# Patient Record
Sex: FEMALE | Race: WHITE | NOT HISPANIC OR LATINO | URBAN - METROPOLITAN AREA
[De-identification: names, ages, dates, MRNs, and addresses within clinical notes are randomized per-mention and may not be internally consistent; named-entity substitution may affect disease eponyms.]

---

## 2018-10-24 ENCOUNTER — OUTPATIENT (OUTPATIENT)
Dept: OUTPATIENT SERVICES | Facility: HOSPITAL | Age: 43
LOS: 1 days | Discharge: HOME | End: 2018-10-24

## 2018-10-24 DIAGNOSIS — Z12.31 ENCOUNTER FOR SCREENING MAMMOGRAM FOR MALIGNANT NEOPLASM OF BREAST: ICD-10-CM

## 2020-01-03 ENCOUNTER — OUTPATIENT (OUTPATIENT)
Dept: OUTPATIENT SERVICES | Facility: HOSPITAL | Age: 45
LOS: 1 days | Discharge: HOME | End: 2020-01-03
Payer: MEDICAID

## 2020-01-03 DIAGNOSIS — N64.4 MASTODYNIA: ICD-10-CM

## 2020-01-03 PROCEDURE — 77062 BREAST TOMOSYNTHESIS BI: CPT | Mod: 26

## 2020-01-03 PROCEDURE — 77066 DX MAMMO INCL CAD BI: CPT | Mod: 26

## 2020-01-03 PROCEDURE — 76642 ULTRASOUND BREAST LIMITED: CPT | Mod: 26,RT

## 2021-08-06 ENCOUNTER — TRANSCRIPTION ENCOUNTER (OUTPATIENT)
Age: 46
End: 2021-08-06

## 2022-05-24 PROBLEM — Z00.00 ENCOUNTER FOR PREVENTIVE HEALTH EXAMINATION: Status: ACTIVE | Noted: 2022-05-24

## 2022-05-26 ENCOUNTER — APPOINTMENT (OUTPATIENT)
Dept: NUTRITION | Facility: CLINIC | Age: 47
End: 2022-05-26

## 2022-05-26 ENCOUNTER — APPOINTMENT (OUTPATIENT)
Dept: ANTEPARTUM | Facility: CLINIC | Age: 47
End: 2022-05-26
Payer: MEDICAID

## 2022-05-26 ENCOUNTER — OUTPATIENT (OUTPATIENT)
Dept: OUTPATIENT SERVICES | Facility: HOSPITAL | Age: 47
LOS: 1 days | Discharge: HOME | End: 2022-05-26

## 2022-05-26 ENCOUNTER — ASOB RESULT (OUTPATIENT)
Age: 47
End: 2022-05-26

## 2022-05-26 VITALS
OXYGEN SATURATION: 99 % | DIASTOLIC BLOOD PRESSURE: 69 MMHG | TEMPERATURE: 98 F | HEART RATE: 89 BPM | WEIGHT: 185 LBS | SYSTOLIC BLOOD PRESSURE: 131 MMHG

## 2022-05-26 DIAGNOSIS — Z78.9 OTHER SPECIFIED HEALTH STATUS: ICD-10-CM

## 2022-05-26 LAB
BILIRUB UR QL STRIP: NEGATIVE
CLARITY UR: CLEAR
COLLECTION METHOD: NORMAL
FETAL HEART DESCRIPTION: NORMAL
FETAL HEART RATE (BPM): 145
FETAL MOVEMENT: PRESENT
GLUCOSE BLDC GLUCOMTR-MCNC: 134
GLUCOSE UR-MCNC: NEGATIVE
HCG UR QL: 0.2 EU/DL
HGB UR QL STRIP.AUTO: NEGATIVE
KETONES UR-MCNC: NORMAL
LEUKOCYTE ESTERASE UR QL STRIP: NEGATIVE
NITRITE UR QL STRIP: NEGATIVE
OB COMMENTS: NORMAL
PH UR STRIP: 6
PROT UR STRIP-MCNC: NEGATIVE
SCHEDULED VISIT: YES
SP GR UR STRIP: 1
URINE ALBUMIN/PROTEIN: NEGATIVE
URINE GLUCOSE: NEGATIVE
URINE KETONES: NEGATIVE
WEEKS GESTATION: 26.3

## 2022-05-26 PROCEDURE — 99203 OFFICE O/P NEW LOW 30 MIN: CPT

## 2022-05-26 PROCEDURE — 76816 OB US FOLLOW-UP PER FETUS: CPT | Mod: 26

## 2022-05-26 PROCEDURE — ZZZZZ: CPT

## 2022-05-26 RX ORDER — FOLIC ACID 1 MG/1
1 TABLET ORAL
Refills: 0 | Status: ACTIVE | COMMUNITY
Start: 2022-05-26

## 2022-05-26 RX ORDER — PNV NO.95/FERROUS FUM/FOLIC AC 28MG-0.8MG
TABLET ORAL
Refills: 0 | Status: ACTIVE | COMMUNITY
Start: 2022-05-26

## 2022-05-26 NOTE — DISCUSSION/SUMMARY
[FreeTextEntry1] : Ms. Jarquin 47 yo  @  26 weeks 3 days referred by Dr. Rajan for gestational diabetes.\par \par Counseling: \par \par 1.  The patient will be evaluated by a nutritionist and instructed in adhering to a diabetic diet.  This is scheduled for 2 weeks from now.\par \par 2.  She was counseled by a nurse and instructed in capillary blood glucose testing (fasting and 2 hours after each meal).\par \par 3.  The significance and usual management of diabetes in pregnancy were reviewed, including risks of preeclampsia, Intra-Uterine Fetal Demise, macrosomia, birth trauma, pre-term labor,  section, NICU admission (the main reasons include  hypoglycemia and  jaundice) and the possible need for insulin therapy.\par \par 4.  Exercise was encouraged.  Ideally, she should walk briskly after each meal.\par \par Recommendations:\par \par 1.  Glycemic Control.  Target glucose ranges to minimize excessive fetal growth are:  Fasting 60-90 mg/dl; preprandial  mg/dl; and 2-hour postprandial < 120 mg/dl.  If these values are elevated, insulin therapy is encouraged, although oral hypoglycemic agents are reasonable to try depending on the finger stick log.\par \par 2.  Antepartum testing.  Daily fetal movement counts are recommended from 28 weeks.  Weekly or twice weekly biophysical profiles may be recommended, the frequency and timing will depend on glucose control.  Ultrasound assessment of fetal growth and macrosomic features is recommended.\par \par 3.  Timing of Delivery.  Most likely delivery will be via repeat  delivery and should occur at around 39 weeks gestation If complications, such as preeclampsia, macrosomia or poor glucose control develop, then delivery plans may need to be revised.\par \par 4.  Route of delivery.   Most likely delivery will be via repeat  delivery.  Diabetes is associated with about a six-fold risk for shoulder dystocia (which includes the risk of irreversible nerve, bone, and brain injury).  Operative vaginal deliveries should be approached with caution if at all.\par \par 5.  Glucose control in labor.  During labor, capillary glucose values should be checked every few hours (depending on their stability).  Insulin may be required to cover elevated glucose levels.\par \par 6.  Long-term diabetes surveillance.  The risk of developing diabetes outside of pregnancy over the next five years may be as high as 50%.  I recommend that she have a 2 hour GTT or similar diabetes screen  at 6 to 12 weeks postpartum with her primary Obstetrician and counseling about ways to minimize her risk.  If her fasting glucose is normal, annual glucose screening by her primary care physician is advised.\par \par Ms. Jarquin has checking her values at home for the past week, she did not bring her log with her today.  She states her fasting values are 87-91 and 90 minute postprandial values (approximately) are between 111-137.   She will start checking her fingersticks 2 hours postprandial.\par \par Advanced maternal age:  The obstetric risks of advanced maternal age were discussed, including but not limited to the increased risk of  gestational hypertension, preeclampsia, gestational diabetes,  labor,  morbidity, placental complications such as abruption and previa, delivery via  and dysfunctional labor, and other maternal morbidity/mortality.  \par \par \par HSV serology positive, she should be on suppression starting at around 36 weeks gestation.\par \par Estimated fetal weight today was 859 grams (18th percentile).\par \par Prenatal care is with Dr. Rajan.\par Fetal movement and labor precautions discussed.\par Estimated fetal weight monthly.\par Weekly biophysical profiles to start by around 37 weeks gestation for advanced maternal age, possibly earlier depending on glycemic control.\par Follow up in 2 weeks with the fingerstick log.\par \par Ms. Jarquin expressed understanding and all of her questions were answered to her satisfaction.  Thank you for this consult.\par \par Gerry Celaya MD

## 2022-05-26 NOTE — PHYSICAL EXAM
[FreeTextEntry1] : General: In no apparent distress.\par HEENT:  Atraumatic. \par Cardiovascular: Regular rate and rhythm, normal S1/S2\par Pulmonary: Clear to auscultation bilaterally.  No wheezing.\par Abdomen: soft, gravid, nontender, nondistended, no rebound, no guarding\par Extremities: no calf tenderness or edema\par Neurology: +2 reflexes in bilateral upper extremities\par Psychiatry:  Happy mood.  Awake , alert, oriented to person, place, time.\par

## 2022-05-26 NOTE — SURGICAL HISTORY
[Fibroids] : fibroids [Cysts] : cysts [Abn Paps] : no abnormal pap smears [STI's] : no STI's [Infertility] : no infertility

## 2022-05-26 NOTE — HISTORY OF PRESENT ILLNESS
[FreeTextEntry1] : [place]  [id number]\par \par Ms. Jarquin 45 yo  @  26 weeks 3 days referred by Dr. Rajan for gestational diabetes.  She has had no issues this pregnancy.\par \par She has been checking her values at home for the past week, she did not bring her log with her today.  She states her fasting values are 87-91 and 90 minute postprandial values (approximately) are between 111-137. \par \par She states she will be scheduled for repeat  delivery.\par \par Doing well, denies contractions, vaginal bleeding, or leakage of fluid. Good fetal movement.\par \par Historical Values:\par  \par  3hr GTT 81/232/191/165\par \par Ob Hx\par \par 1 TOP\par 4 ectopic pregnancies treated with methotrexate\par  girl FT 3100 grams  [facility] cord prolapse.  No gestational diabetes\par

## 2022-05-27 DIAGNOSIS — O09.522 SUPERVISION OF ELDERLY MULTIGRAVIDA, SECOND TRIMESTER: ICD-10-CM

## 2022-05-27 DIAGNOSIS — O24.419 GESTATIONAL DIABETES MELLITUS IN PREGNANCY, UNSPECIFIED CONTROL: ICD-10-CM

## 2022-05-27 DIAGNOSIS — O34.219 MATERNAL CARE FOR UNSPECIFIED TYPE SCAR FROM PREVIOUS CESAREAN DELIVERY: ICD-10-CM

## 2022-05-27 DIAGNOSIS — Z3A.36 36 WEEKS GESTATION OF PREGNANCY: ICD-10-CM

## 2022-06-09 ENCOUNTER — RESULT CHARGE (OUTPATIENT)
Age: 47
End: 2022-06-09

## 2022-06-09 ENCOUNTER — APPOINTMENT (OUTPATIENT)
Dept: NUTRITION | Facility: CLINIC | Age: 47
End: 2022-06-09

## 2022-06-09 ENCOUNTER — OUTPATIENT (OUTPATIENT)
Dept: OUTPATIENT SERVICES | Facility: HOSPITAL | Age: 47
LOS: 1 days | Discharge: HOME | End: 2022-06-09

## 2022-06-09 ENCOUNTER — APPOINTMENT (OUTPATIENT)
Dept: ANTEPARTUM | Facility: CLINIC | Age: 47
End: 2022-06-09
Payer: MEDICAID

## 2022-06-09 ENCOUNTER — APPOINTMENT (OUTPATIENT)
Dept: OBGYN | Facility: CLINIC | Age: 47
End: 2022-06-09
Payer: MEDICAID

## 2022-06-09 VITALS
TEMPERATURE: 98.3 F | HEIGHT: 65 IN | DIASTOLIC BLOOD PRESSURE: 62 MMHG | HEART RATE: 109 BPM | SYSTOLIC BLOOD PRESSURE: 114 MMHG | OXYGEN SATURATION: 100 % | WEIGHT: 184 LBS | BODY MASS INDEX: 30.66 KG/M2

## 2022-06-09 DIAGNOSIS — O24.419 GESTATIONAL DIABETES MELLITUS IN PREGNANCY, UNSPECIFIED CONTROL: ICD-10-CM

## 2022-06-09 DIAGNOSIS — O09.90 SUPERVISION OF HIGH RISK PREGNANCY, UNSPECIFIED, UNSPECIFIED TRIMESTER: ICD-10-CM

## 2022-06-09 PROCEDURE — 99213 OFFICE O/P EST LOW 20 MIN: CPT | Mod: 25

## 2022-06-10 PROBLEM — O24.419 GESTATIONAL DIABETES MELLITUS (GDM): Status: ACTIVE | Noted: 2022-05-26

## 2022-06-10 PROBLEM — O09.90 HIGH RISK PREGNANCY, ANTEPARTUM: Status: ACTIVE | Noted: 2022-05-26

## 2022-06-10 LAB
BILIRUB UR QL STRIP: NORMAL
BP DIAS: 62 MM HG
BP SYS: 114 MM HG
CLARITY UR: CLEAR
COLLECTION METHOD: NORMAL
FETAL HEART RATE (BPM): 144
FETAL MOVEMENT: PRESENT
GLUCOSE BLDC GLUCOMTR-MCNC: 113
GLUCOSE UR-MCNC: NORMAL
HCG UR QL: 0.2 EU/DL
HGB UR QL STRIP.AUTO: NORMAL
KETONES UR-MCNC: NORMAL
LEUKOCYTE ESTERASE UR QL STRIP: NORMAL
NITRITE UR QL STRIP: NORMAL
OB COMMENTS: NORMAL
PH UR STRIP: 7
PROT UR STRIP-MCNC: NORMAL
SCHEDULED VISIT: YES
SP GR UR STRIP: 1.01
URINE ALBUMIN/PROTEIN: NORMAL
URINE GLUCOSE: NORMAL
URINE KETONES: NORMAL
WEEKS GESTATION: 28.3

## 2022-06-10 NOTE — HISTORY OF PRESENT ILLNESS
[FreeTextEntry1] : Ms. Jarquin 47 yo  @28w3d BERNIE 22 referred by Dr. Rajan for gestational diabetes.\par \par Denies contractions, vaginal bleeding, leakage of fluid. Reports good fetal movement. Has some pain along hip crease while walking. \par \par Plans for repeat C/S. \par \par FS today: 113 (1hr postprandial raspberries, salmon tomato salad)\par FS log reviewed:\par Fastin-94\par 2hr PP:  (6 out of 40 elevated)\par \par Historical Values:\par  \par  3hr GTT 81/232/191/165\par

## 2022-06-10 NOTE — DISCUSSION/SUMMARY
[FreeTextEntry1] : Ms. Jarquin 45 yo  @28w3d, BERNIE 22, referred by Dr. Rajan for gestational diabetes.\par \par #GDMA1\par - FS today: 113 (1hr postprandial raspberries, salmon tomato salad)\par FS log reviewed:\par Fastin-94\par 2hr PP:  (6 out of 40 elevated), related to dietary indiscretions\par - s/p diabetic teaching and nutrition counseling \par - Discussed risks of GDM including but not limited to: macrosomia, shoulder dystocia, increased risk of , stillbirth, NICU admission for hypoglycemia and jaundice, and preeclampsia. Explained that if sugars are well controlled the above complications decrease. \par - FS goal fasting <95, 2 hour PP<120. Encouraged to document FS. \par - Discussed increased risk of type II DM later in life and that we will screen for that after pregnancy. \par \par #AMA\par \par #HSV serology positive\par - 36w suppression with Valtrex\par \par #pregnancy\par -  EFW 859g (18%)\par - BPP weekly @36w for AMA\par \par RTC in 6 wks\par

## 2022-06-10 NOTE — END OF VISIT
[] : Resident [FreeTextEntry3] : 28 3/7 weeks with GDM\par Overall, well controlled. Counseled at length.\par RTC in 6 weeks if no significant change in clinical course.\par

## 2022-06-10 NOTE — OB HISTORY
[FreeTextEntry1] : 1 TOP\par 4 ectopic pregnancies treated with methotrexate\par 2004 girl FT 3100 grams [facility] cord prolapse. No gestational diabetes\par

## 2022-06-10 NOTE — PHYSICAL EXAM
[FreeTextEntry1] : General: In no apparent distress. \par \par HEENT:  Atraumatic.  Extraocular muscles intact.  \par \par Cardiovascular: Regular rate and rhythm, normal S1/S2 \par \par Pulmonary: Clear to auscultation bilaterally.  No wheezing. \par \par Abdomen: soft, gravid, nontender, nondistended, no rebound, no guarding \par \par Extremities: no calf tenderness or edema \par \par Neurology: +2 reflexes in bilateral upper extremities \par \par Psychiatry:  Happy mood.  Awake, alert, oriented to person, place, time. \par \par \par

## 2022-06-14 LAB — GLUCOSE BLDC GLUCOMTR-MCNC: 113 MG/DL — HIGH (ref 70–99)

## 2022-06-16 ENCOUNTER — NON-APPOINTMENT (OUTPATIENT)
Age: 47
End: 2022-06-16

## 2022-06-21 ENCOUNTER — OUTPATIENT (OUTPATIENT)
Dept: OUTPATIENT SERVICES | Facility: HOSPITAL | Age: 47
LOS: 1 days | Discharge: HOME | End: 2022-06-21

## 2022-06-21 VITALS — DIASTOLIC BLOOD PRESSURE: 78 MMHG | HEART RATE: 96 BPM | SYSTOLIC BLOOD PRESSURE: 117 MMHG

## 2022-06-21 VITALS — HEART RATE: 110 BPM | DIASTOLIC BLOOD PRESSURE: 89 MMHG | SYSTOLIC BLOOD PRESSURE: 144 MMHG

## 2022-06-21 DIAGNOSIS — Z98.891 HISTORY OF UTERINE SCAR FROM PREVIOUS SURGERY: Chronic | ICD-10-CM

## 2022-06-21 LAB
ALBUMIN SERPL ELPH-MCNC: 3.2 G/DL — LOW (ref 3.5–5.2)
ALP SERPL-CCNC: 125 U/L — HIGH (ref 30–115)
ALT FLD-CCNC: 26 U/L — SIGNIFICANT CHANGE UP (ref 0–41)
ANION GAP SERPL CALC-SCNC: 11 MMOL/L — SIGNIFICANT CHANGE UP (ref 7–14)
APPEARANCE UR: ABNORMAL
AST SERPL-CCNC: 19 U/L — SIGNIFICANT CHANGE UP (ref 0–41)
BACTERIA # UR AUTO: NEGATIVE — SIGNIFICANT CHANGE UP
BASOPHILS # BLD AUTO: 0.03 K/UL — SIGNIFICANT CHANGE UP (ref 0–0.2)
BASOPHILS NFR BLD AUTO: 0.3 % — SIGNIFICANT CHANGE UP (ref 0–1)
BILIRUB SERPL-MCNC: 0.4 MG/DL — SIGNIFICANT CHANGE UP (ref 0.2–1.2)
BILIRUB UR-MCNC: NEGATIVE — SIGNIFICANT CHANGE UP
BLD GP AB SCN SERPL QL: SIGNIFICANT CHANGE UP
BUN SERPL-MCNC: 6 MG/DL — LOW (ref 10–20)
CALCIUM SERPL-MCNC: 8.6 MG/DL — SIGNIFICANT CHANGE UP (ref 8.5–10.1)
CHLORIDE SERPL-SCNC: 107 MMOL/L — SIGNIFICANT CHANGE UP (ref 98–110)
CO2 SERPL-SCNC: 20 MMOL/L — SIGNIFICANT CHANGE UP (ref 17–32)
COLOR SPEC: YELLOW — SIGNIFICANT CHANGE UP
CREAT ?TM UR-MCNC: 81 MG/DL — SIGNIFICANT CHANGE UP
CREAT SERPL-MCNC: 0.6 MG/DL — LOW (ref 0.7–1.5)
DIFF PNL FLD: NEGATIVE — SIGNIFICANT CHANGE UP
EGFR: 113 ML/MIN/1.73M2 — SIGNIFICANT CHANGE UP
EOSINOPHIL # BLD AUTO: 0.04 K/UL — SIGNIFICANT CHANGE UP (ref 0–0.7)
EOSINOPHIL NFR BLD AUTO: 0.4 % — SIGNIFICANT CHANGE UP (ref 0–8)
EPI CELLS # UR: 12 /HPF — HIGH (ref 0–5)
GLUCOSE BLDC GLUCOMTR-MCNC: 130 MG/DL — HIGH (ref 70–99)
GLUCOSE SERPL-MCNC: 121 MG/DL — HIGH (ref 70–99)
GLUCOSE UR QL: NEGATIVE — SIGNIFICANT CHANGE UP
HCT VFR BLD CALC: 34.6 % — LOW (ref 37–47)
HGB BLD-MCNC: 11.7 G/DL — LOW (ref 12–16)
HYALINE CASTS # UR AUTO: 3 /LPF — SIGNIFICANT CHANGE UP (ref 0–7)
IMM GRANULOCYTES NFR BLD AUTO: 0.6 % — HIGH (ref 0.1–0.3)
KETONES UR-MCNC: NEGATIVE — SIGNIFICANT CHANGE UP
LDH SERPL L TO P-CCNC: 131 — SIGNIFICANT CHANGE UP (ref 50–242)
LEUKOCYTE ESTERASE UR-ACNC: NEGATIVE — SIGNIFICANT CHANGE UP
LYMPHOCYTES # BLD AUTO: 1.68 K/UL — SIGNIFICANT CHANGE UP (ref 1.2–3.4)
LYMPHOCYTES # BLD AUTO: 18.1 % — LOW (ref 20.5–51.1)
MCHC RBC-ENTMCNC: 28.5 PG — SIGNIFICANT CHANGE UP (ref 27–31)
MCHC RBC-ENTMCNC: 33.8 G/DL — SIGNIFICANT CHANGE UP (ref 32–37)
MCV RBC AUTO: 84.4 FL — SIGNIFICANT CHANGE UP (ref 81–99)
MONOCYTES # BLD AUTO: 0.6 K/UL — SIGNIFICANT CHANGE UP (ref 0.1–0.6)
MONOCYTES NFR BLD AUTO: 6.5 % — SIGNIFICANT CHANGE UP (ref 1.7–9.3)
NEUTROPHILS # BLD AUTO: 6.86 K/UL — HIGH (ref 1.4–6.5)
NEUTROPHILS NFR BLD AUTO: 74.1 % — SIGNIFICANT CHANGE UP (ref 42.2–75.2)
NITRITE UR-MCNC: NEGATIVE — SIGNIFICANT CHANGE UP
NRBC # BLD: 0 /100 WBCS — SIGNIFICANT CHANGE UP (ref 0–0)
PH UR: 7 — SIGNIFICANT CHANGE UP (ref 5–8)
PLATELET # BLD AUTO: 223 K/UL — SIGNIFICANT CHANGE UP (ref 130–400)
POTASSIUM SERPL-MCNC: 3.4 MMOL/L — LOW (ref 3.5–5)
POTASSIUM SERPL-SCNC: 3.4 MMOL/L — LOW (ref 3.5–5)
PROT ?TM UR-MCNC: 28 MG/DLG/24H — SIGNIFICANT CHANGE UP
PROT SERPL-MCNC: 5.8 G/DL — LOW (ref 6–8)
PROT UR-MCNC: ABNORMAL
PROT/CREAT UR-RTO: 0.3 RATIO — HIGH (ref 0–0.2)
RBC # BLD: 4.1 M/UL — LOW (ref 4.2–5.4)
RBC # FLD: 13.2 % — SIGNIFICANT CHANGE UP (ref 11.5–14.5)
RBC CASTS # UR COMP ASSIST: 2 /HPF — SIGNIFICANT CHANGE UP (ref 0–4)
SODIUM SERPL-SCNC: 138 MMOL/L — SIGNIFICANT CHANGE UP (ref 135–146)
SP GR SPEC: 1.01 — SIGNIFICANT CHANGE UP (ref 1.01–1.03)
URATE SERPL-MCNC: 3.5 MG/DL — SIGNIFICANT CHANGE UP (ref 2.5–7)
UROBILINOGEN FLD QL: SIGNIFICANT CHANGE UP
WBC # BLD: 9.27 K/UL — SIGNIFICANT CHANGE UP (ref 4.8–10.8)
WBC # FLD AUTO: 9.27 K/UL — SIGNIFICANT CHANGE UP (ref 4.8–10.8)
WBC UR QL: 5 /HPF — SIGNIFICANT CHANGE UP (ref 0–5)

## 2022-06-21 NOTE — OB PROVIDER TRIAGE NOTE - NSOBPROVIDERNOTE_OBGYN_ALL_OB_FT
47yo  at 30w1d, GBS unknown, h/o C/S x1, h/o ectopic pregnancy x4 s/p MTX x4, COVID pos  currently asymptomatic, with elevated BP in nonsevere range and asymptomatic for preeclamptic symptoms, currently with reassuring maternal and fetal status  - monitor vitals q15 min for at least 4hr  - f/u preeclamptic labs, UA, UrPrCr  - reevaluate    Dr Antony and Dr Rajan to be aware 45yo  at 30w1d, GBS unknown, h/o C/S x1, h/o ectopic pregnancy x4 s/p MTX x4, COVID pos  currently asymptomatic, with mild polyhydramnios on today's sono, with elevated BP in nonsevere range and asymptomatic for preeclamptic symptoms, currently with reassuring maternal and fetal status  - monitor vitals q15 min for at least 4hr  - f/u preeclamptic labs, UA, UrPrCr  - reevaluate    Dr Antony and Dr Rajan to be aware

## 2022-06-21 NOTE — OB PROVIDER TRIAGE NOTE - NS_OBGYNHISTORY_OBGYN_ALL_OB_FT
OB HX   FT C/S x1 in [place], secondary to cord prolapse, 3100g  Etop x1 (pt did not endorse, was noted in chart)  Ectopic x4, s/p MTX x4    GYN Hx Denies uterine fibroids, ovarian cysts, abnormal paps, STIs

## 2022-06-21 NOTE — OB PROVIDER TRIAGE NOTE - HISTORY OF PRESENT ILLNESS
45yo  at 30w1d, BERNIE 22, presents to L&D for blood pressure monitoring. Last night, patient was feeling weak and unable to sleep, measuring her BP at home and was 135/105. Her PMD advised to follow up on L&D for BP monitoring. At this time, she denies HA, CP, SOB, changes in vision, RUQ/epigastric pain, LE edema. Patient was COVID positive on Friday, was febrile with nasal congestion through Saturday, and symptoms have improved since then. Denies contractions/cramping, leakage of fluid, vaginal bleeding or discharge. Feels good FM. Denies nausea, vomiting, fevers, chills, urinary symptoms, changes in bowel habits.   Pregnancy complicated by:  - GDMA1, well controlled, s/p MFM consult.   - History of C/S x1 for cord prolapse, plan is for repeat c/s.

## 2022-06-21 NOTE — OB PROVIDER TRIAGE NOTE - ADDITIONAL INSTRUCTIONS
Increase your fluid intake  If you experience headaches, visual changes or abdominal pain call your doctor or return to the hospital  You may take Benadryl 25mg every 6 hrs as needed  follow up 1 week with your doctor for blood pressure check

## 2022-06-21 NOTE — OB PROVIDER TRIAGE NOTE - NSHPPHYSICALEXAM_GEN_ALL_CORE
Physical exam:    Vital Signs Last 24 Hrs  T(F): 97.7 (21 Jun 2022 12:06), Max: 97.7 (21 Jun 2022 12:00)  HR: 103 (21 Jun 2022 12:25) (103 - 111)  BP: 125/83 (21 Jun 2022 12:25) (124/82 - 144/89)  RR: 16 (21 Jun 2022 12:06) (16 - 16)    Gen: AAOx3, NAD  Heart: RRR, S1 S2 WNL  Lungs: CTAB  Abdomen: Soft, nontender, no distension, gravid, no RUQ/epigastric tenderness, no palpable contractions  Ext: No calf tenderness, no swelling    EFM: 130/mod scott/pos accel  toco: none  SVE: deferred  BSS:    POCT blood glucose: 130 Physical exam:    Vital Signs Last 24 Hrs  T(F): 97.7 (21 Jun 2022 12:06), Max: 97.7 (21 Jun 2022 12:00)  HR: 103 (21 Jun 2022 12:25) (103 - 111)  BP: 125/83 (21 Jun 2022 12:25) (124/82 - 144/89)  RR: 16 (21 Jun 2022 12:06) (16 - 16)    Gen: AAOx3, NAD  Heart: RRR, S1 S2 WNL  Lungs: CTAB  Abdomen: Soft, nontender, no distension, gravid, no RUQ/epigastric tenderness, no palpable contractions  Ext: No calf tenderness, no swelling    EFM: 130/mod scott/pos accel  toco: none  SVE: deferred  BSS: breech, posterior placenta, MVP 8.11cm, BPP 8/8    POCT blood glucose: 130

## 2022-06-27 ENCOUNTER — APPOINTMENT (OUTPATIENT)
Dept: ANTEPARTUM | Facility: CLINIC | Age: 47
End: 2022-06-27

## 2022-06-27 ENCOUNTER — OUTPATIENT (OUTPATIENT)
Dept: OUTPATIENT SERVICES | Facility: HOSPITAL | Age: 47
LOS: 1 days | Discharge: HOME | End: 2022-06-27

## 2022-06-27 VITALS
WEIGHT: 166.01 LBS | HEART RATE: 68 BPM | TEMPERATURE: 98 F | SYSTOLIC BLOOD PRESSURE: 114 MMHG | RESPIRATION RATE: 20 BRPM | HEIGHT: 66 IN | DIASTOLIC BLOOD PRESSURE: 64 MMHG

## 2022-06-27 VITALS — DIASTOLIC BLOOD PRESSURE: 71 MMHG | HEART RATE: 81 BPM | SYSTOLIC BLOOD PRESSURE: 112 MMHG

## 2022-06-27 DIAGNOSIS — Z98.891 HISTORY OF UTERINE SCAR FROM PREVIOUS SURGERY: Chronic | ICD-10-CM

## 2022-06-27 PROBLEM — Z78.9 OTHER SPECIFIED HEALTH STATUS: Chronic | Status: ACTIVE | Noted: 2022-06-21

## 2022-06-27 LAB
ALBUMIN SERPL ELPH-MCNC: 3 G/DL — LOW (ref 3.5–5.2)
ALP SERPL-CCNC: 122 U/L — HIGH (ref 30–115)
ALT FLD-CCNC: 27 U/L — SIGNIFICANT CHANGE UP (ref 0–41)
ANION GAP SERPL CALC-SCNC: 11 MMOL/L — SIGNIFICANT CHANGE UP (ref 7–14)
AST SERPL-CCNC: 22 U/L — SIGNIFICANT CHANGE UP (ref 0–41)
BASOPHILS # BLD AUTO: 0.02 K/UL — SIGNIFICANT CHANGE UP (ref 0–0.2)
BASOPHILS NFR BLD AUTO: 0.2 % — SIGNIFICANT CHANGE UP (ref 0–1)
BILIRUB SERPL-MCNC: 0.4 MG/DL — SIGNIFICANT CHANGE UP (ref 0.2–1.2)
BUN SERPL-MCNC: 10 MG/DL — SIGNIFICANT CHANGE UP (ref 10–20)
CALCIUM SERPL-MCNC: 8.3 MG/DL — LOW (ref 8.5–10.1)
CHLORIDE SERPL-SCNC: 107 MMOL/L — SIGNIFICANT CHANGE UP (ref 98–110)
CO2 SERPL-SCNC: 20 MMOL/L — SIGNIFICANT CHANGE UP (ref 17–32)
CREAT SERPL-MCNC: 0.6 MG/DL — LOW (ref 0.7–1.5)
EGFR: 113 ML/MIN/1.73M2 — SIGNIFICANT CHANGE UP
EOSINOPHIL # BLD AUTO: 0.03 K/UL — SIGNIFICANT CHANGE UP (ref 0–0.7)
EOSINOPHIL NFR BLD AUTO: 0.3 % — SIGNIFICANT CHANGE UP (ref 0–8)
GLUCOSE BLDC GLUCOMTR-MCNC: 124 MG/DL — HIGH (ref 70–99)
GLUCOSE SERPL-MCNC: 112 MG/DL — HIGH (ref 70–99)
HCT VFR BLD CALC: 32.5 % — LOW (ref 37–47)
HGB BLD-MCNC: 11.3 G/DL — LOW (ref 12–16)
IMM GRANULOCYTES NFR BLD AUTO: 1.7 % — HIGH (ref 0.1–0.3)
LDH SERPL L TO P-CCNC: 132 — SIGNIFICANT CHANGE UP (ref 50–242)
LYMPHOCYTES # BLD AUTO: 1.67 K/UL — SIGNIFICANT CHANGE UP (ref 1.2–3.4)
LYMPHOCYTES # BLD AUTO: 15 % — LOW (ref 20.5–51.1)
MCHC RBC-ENTMCNC: 29 PG — SIGNIFICANT CHANGE UP (ref 27–31)
MCHC RBC-ENTMCNC: 34.8 G/DL — SIGNIFICANT CHANGE UP (ref 32–37)
MCV RBC AUTO: 83.5 FL — SIGNIFICANT CHANGE UP (ref 81–99)
MONOCYTES # BLD AUTO: 0.92 K/UL — HIGH (ref 0.1–0.6)
MONOCYTES NFR BLD AUTO: 8.3 % — SIGNIFICANT CHANGE UP (ref 1.7–9.3)
NEUTROPHILS # BLD AUTO: 8.31 K/UL — HIGH (ref 1.4–6.5)
NEUTROPHILS NFR BLD AUTO: 74.5 % — SIGNIFICANT CHANGE UP (ref 42.2–75.2)
NRBC # BLD: 0 /100 WBCS — SIGNIFICANT CHANGE UP (ref 0–0)
PLATELET # BLD AUTO: 264 K/UL — SIGNIFICANT CHANGE UP (ref 130–400)
POTASSIUM SERPL-MCNC: 3.6 MMOL/L — SIGNIFICANT CHANGE UP (ref 3.5–5)
POTASSIUM SERPL-SCNC: 3.6 MMOL/L — SIGNIFICANT CHANGE UP (ref 3.5–5)
PROT SERPL-MCNC: 5.6 G/DL — LOW (ref 6–8)
RBC # BLD: 3.89 M/UL — LOW (ref 4.2–5.4)
RBC # FLD: 13.2 % — SIGNIFICANT CHANGE UP (ref 11.5–14.5)
SODIUM SERPL-SCNC: 138 MMOL/L — SIGNIFICANT CHANGE UP (ref 135–146)
URATE SERPL-MCNC: 3.8 MG/DL — SIGNIFICANT CHANGE UP (ref 2.5–7)
WBC # BLD: 11.14 K/UL — HIGH (ref 4.8–10.8)
WBC # FLD AUTO: 11.14 K/UL — HIGH (ref 4.8–10.8)

## 2022-06-27 RX ORDER — INFLUENZA VIRUS VACCINE 15; 15; 15; 15 UG/.5ML; UG/.5ML; UG/.5ML; UG/.5ML
0.5 SUSPENSION INTRAMUSCULAR ONCE
Refills: 0 | Status: DISCONTINUED | OUTPATIENT
Start: 2022-06-27 | End: 2022-07-11

## 2022-06-27 NOTE — OB PROVIDER TRIAGE NOTE - NSHPPHYSICALEXAM_GEN_ALL_CORE
T(C): 36.7 (06-27-22 @ 10:42), Max: 36.7 (06-27-22 @ 10:42)  HR: 90 (06-27-22 @ 11:04) (68 - 98)  BP: 131/86 (06-27-22 @ 11:04) (114/64 - 131/86)  RR: 20 (06-27-22 @ 10:42) (20 - 20)    Abd: gravid, soft, NT  VE deferred  Ctx: none  FHR: 140 moderate variability, Cat I

## 2022-06-27 NOTE — OB PROVIDER TRIAGE NOTE - NSOBPROVIDERNOTE_OBGYN_ALL_OB_FT
46y.o.  @31wks elevated BP at home for BP monitoring r/o gHTN vs preeclampsia, AMA, GDM A1, Covid positive on .   - Serial BP's  - Preeclampsia labs  - Continuous efm and toco  - Dr. Antony/ Dr. Rajan aware

## 2022-06-27 NOTE — OB PROVIDER TRIAGE NOTE - ADDITIONAL INSTRUCTIONS
Discharge to home  If you experience headache not relieved with tylenol, changes to your vision or abdominal pain Call your doctor  Monitor blood pressure at home, 2 times a day while resting  Followup with your doctor on Wednesday 6/29 @ 8pm

## 2022-06-27 NOTE — OB RN TRIAGE NOTE - FALL HARM RISK - UNIVERSAL INTERVENTIONS
Bed in lowest position, wheels locked, appropriate side rails in place/Call bell, personal items and telephone in reach/Instruct patient to call for assistance before getting out of bed or chair/Non-slip footwear when patient is out of bed/[place] to call system/Physically safe environment - no spills, clutter or unnecessary equipment/Purposeful Proactive Rounding/Room/bathroom lighting operational, light cord in reach

## 2022-06-27 NOTE — OB PROVIDER TRIAGE NOTE - HISTORY OF PRESENT ILLNESS
Pt is a 46y.o.  @ 31wks presents for BP monitoring. Pt reports she took her BP at home last night and it was 155/105. Pt states her PMD told her to come for monitoring Pt denies HA, visual changes or epigastric pain, denies recent weight gain or edema. Pt reports she has been "feeling weak" and is having difficulty sleeping. Pt tested positive for Covid-19 on  and has c/o occaisional cough. Pregnancy is complicated by GDM A1. Pt denies ctx, LOF or VB and reports good FM.    Pt was evaluated in L&D on 2022. Pt did not meet criteria for gHTN. labs were WNl. UTP was 0.3    Pt is scheduled for repeat  on 2022 Pt is a 46y.o.  @ 31wks presents for BP monitoring. Pt reports she took her BP at home last night bc she was "feeling bad". and it was 155/105. Pt reports she had woken from sleep feeling "scared". Pt felt like she had to walk around and get air. Pt states she is anxious bc of her age and she worries about the baby. Pt has been taking Benadryl to sleep but did not take it last night.  Pt denies HA, visual changes or epigastric pain, denies recent weight gain or edema. Pt reports she has been "feeling weak" and is having difficulty sleeping. Pt tested positive for Covid-19 on  and has c/o occaisional cough. Pregnancy is complicated by GDM A1. Pt denies ctx, LOF or VB and reports good FM.    Pt was evaluated in L&D on 2022. Pt did not meet criteria for gHTN. labs were WNl. UTP was 0.3    Pt is scheduled for repeat  on 2022

## 2022-06-27 NOTE — OB PROVIDER TRIAGE NOTE - NSHPLABSRESULTS_GEN_ALL_CORE
no prenatal chart available    sonos:  @26.3 +FHR, MVP 6.9cm, EFW 859g    6/21/2022    9.2>11.7/ 34.6< 223  AST: 19, ALT: 26, UA 3.5  UTP 0,3

## 2022-07-21 ENCOUNTER — OUTPATIENT (OUTPATIENT)
Dept: OUTPATIENT SERVICES | Facility: HOSPITAL | Age: 47
LOS: 1 days | Discharge: HOME | End: 2022-07-21

## 2022-07-21 ENCOUNTER — APPOINTMENT (OUTPATIENT)
Dept: ANTEPARTUM | Facility: CLINIC | Age: 47
End: 2022-07-21

## 2022-07-21 VITALS
DIASTOLIC BLOOD PRESSURE: 81 MMHG | TEMPERATURE: 98.4 F | HEART RATE: 87 BPM | SYSTOLIC BLOOD PRESSURE: 126 MMHG | OXYGEN SATURATION: 100 % | BODY MASS INDEX: 29.95 KG/M2 | WEIGHT: 180 LBS

## 2022-07-21 DIAGNOSIS — Z98.891 HISTORY OF UTERINE SCAR FROM PREVIOUS SURGERY: Chronic | ICD-10-CM

## 2022-07-21 LAB
FETAL HEART DESCRIPTION: NORMAL
FETAL HEART RATE (BPM): 140
FETAL MOVEMENT: PRESENT
GLUCOSE BLDC GLUCOMTR-MCNC: 101 MG/DL — HIGH (ref 70–99)
OB COMMENTS: NORMAL
SCHEDULED VISIT: YES
URINE ALBUMIN/PROTEIN: NEGATIVE
URINE GLUCOSE: NEGATIVE
URINE KETONES: NEGATIVE
WEEKS GESTATION: 364.3

## 2022-07-21 PROCEDURE — 76816 OB US FOLLOW-UP PER FETUS: CPT | Mod: 26

## 2022-07-21 PROCEDURE — 76819 FETAL BIOPHYS PROFIL W/O NST: CPT | Mod: 26

## 2022-07-21 PROCEDURE — 99213 OFFICE O/P EST LOW 20 MIN: CPT | Mod: 25

## 2022-07-21 NOTE — DISCUSSION/SUMMARY
[FreeTextEntry1] : [facility] Consult, cont'd: \par 45 yo  @34w3d, BERNIE 22, referred by Dr. Rajan for gestational diabetes. \par \par 1. GDMA1 FS today: 101.  Excellent BS control. \par     *Fastin-95 (0 out of 36 values elevated) \par     *2hr PP:  (10 out of 106 values elevated). S/p diabetic teaching and nutrition counseling  \par - Previously discussed risks of GDM including but not limited to: macrosomia, shoulder dystocia, increased risk of , stillbirth, NICU admission for hypoglycemia and jaundice, and preeclampsia. Explained that if sugars are well controlled the above complications decrease.  \par - FS goal fasting <95, 2 hour PP<120. Encouraged to document FS.  \par - Discussed increased risk of type II DM later in life and that we will screen for that after pregnancy.  \par -  EFW 859g (18%) \par -->Begin Weekly fetal testing once weekly at [facility].\par \par 2. AMA.  NIPT low risk.  Has not been taking ASA  \par \par 3. HSV serology positive: 36w suppression with Valtrex \par \par 4. Pregnancy:  c/w routine prenatal care with   \par -Plans for repeat C/S, scheduled for \par -PTL precautions \par -PNV  \par - BPP weekly for AMA \par - growth q4w, schedule for next week \par  \par RTC in 1 wk with FS logs\par \par MD Kiara, FACOG

## 2022-07-21 NOTE — HISTORY OF PRESENT ILLNESS
[FreeTextEntry1] : 22: M Consult f/u:\par 46y  @34w3d BERNIE 22 here for f/u visit, referred by Dr. Rajan for gestational diabetes. Last M visit on .\par Denies contractions, vaginal bleeding, leakage of fluid. Reports good fetal movement.  \par Had COVID19 1 month ago and continues to have residual cough. Denies SOB. Reported mild symptoms at time of infection, not requiring hospitalization. Patient fully vaccinated (Pfizer) but has not gotten booster. \par \par Plans for repeat C/S.  \par \par FS log reviewed: \par Fastin-95 (0 out of 36 values elevated) \par 2hr PP:  (10 out of 106 values elevated)\par  \par Historical Values: \par   \par  3hr GTT 81/232/191/165

## 2022-07-21 NOTE — OB HISTORY
[FreeTextEntry1] : Pregnancy Course:   \par  1 TOP \par 4 ectopic pregnancies treated with methotrexate \par 2004 girl FT 3100 grams [place] cord prolapse. No gestational diabetes

## 2022-07-22 DIAGNOSIS — O34.219 MATERNAL CARE FOR UNSPECIFIED TYPE SCAR FROM PREVIOUS CESAREAN DELIVERY: ICD-10-CM

## 2022-07-22 DIAGNOSIS — O09.522 SUPERVISION OF ELDERLY MULTIGRAVIDA, SECOND TRIMESTER: ICD-10-CM

## 2022-07-22 DIAGNOSIS — O24.419 GESTATIONAL DIABETES MELLITUS IN PREGNANCY, UNSPECIFIED CONTROL: ICD-10-CM

## 2022-07-22 DIAGNOSIS — Z3A.34 34 WEEKS GESTATION OF PREGNANCY: ICD-10-CM

## 2022-07-28 ENCOUNTER — APPOINTMENT (OUTPATIENT)
Dept: ANTEPARTUM | Facility: CLINIC | Age: 47
End: 2022-07-28

## 2022-07-28 ENCOUNTER — OUTPATIENT (OUTPATIENT)
Dept: OUTPATIENT SERVICES | Facility: HOSPITAL | Age: 47
LOS: 1 days | Discharge: HOME | End: 2022-07-28

## 2022-07-28 ENCOUNTER — RESULT CHARGE (OUTPATIENT)
Age: 47
End: 2022-07-28

## 2022-07-28 ENCOUNTER — ASOB RESULT (OUTPATIENT)
Age: 47
End: 2022-07-28

## 2022-07-28 VITALS
SYSTOLIC BLOOD PRESSURE: 125 MMHG | HEART RATE: 84 BPM | OXYGEN SATURATION: 100 % | BODY MASS INDEX: 30.12 KG/M2 | DIASTOLIC BLOOD PRESSURE: 80 MMHG | WEIGHT: 181 LBS | TEMPERATURE: 98 F

## 2022-07-28 DIAGNOSIS — Z98.891 HISTORY OF UTERINE SCAR FROM PREVIOUS SURGERY: Chronic | ICD-10-CM

## 2022-07-28 LAB — GLUCOSE BLDC GLUCOMTR-MCNC: 84 MG/DL — SIGNIFICANT CHANGE UP (ref 70–99)

## 2022-07-28 PROCEDURE — ZZZZZ: CPT

## 2022-07-28 PROCEDURE — 76819 FETAL BIOPHYS PROFIL W/O NST: CPT | Mod: 26

## 2022-07-28 PROCEDURE — 99213 OFFICE O/P EST LOW 20 MIN: CPT | Mod: 25

## 2022-07-28 PROCEDURE — 76816 OB US FOLLOW-UP PER FETUS: CPT | Mod: 26

## 2022-08-01 LAB
BILIRUB UR QL STRIP: NEGATIVE
BILIRUB UR QL STRIP: NEGATIVE
CLARITY UR: CLEAR
CLARITY UR: CLEAR
COLLECTION METHOD: NORMAL
COLLECTION METHOD: NORMAL
FETAL HEART DESCRIPTION: NORMAL
FETAL HEART RATE (BPM): 146
FETAL MOVEMENT: PRESENT
GLUCOSE BLDC GLUCOMTR-MCNC: 101
GLUCOSE BLDC GLUCOMTR-MCNC: 84
GLUCOSE UR-MCNC: NEGATIVE
GLUCOSE UR-MCNC: NORMAL
HCG UR QL: 0.2 EU/DL
HCG UR QL: 0.2 EU/DL
HGB UR QL STRIP.AUTO: NEGATIVE
HGB UR QL STRIP.AUTO: NEGATIVE
KETONES UR-MCNC: NEGATIVE
KETONES UR-MCNC: NEGATIVE
LEUKOCYTE ESTERASE UR QL STRIP: NEGATIVE
LEUKOCYTE ESTERASE UR QL STRIP: NORMAL
NITRITE UR QL STRIP: NEGATIVE
NITRITE UR QL STRIP: NORMAL
OB COMMENTS: NORMAL
OB COMMENTS: NORMAL
PH UR STRIP: 6
PH UR STRIP: 6.5
PROT UR STRIP-MCNC: NEGATIVE
PROT UR STRIP-MCNC: NEGATIVE
SCHEDULED VISIT: YES
SCHEDULED VISIT: YES
SP GR UR STRIP: 1
SP GR UR STRIP: 1.01
URINE ALBUMIN/PROTEIN: NEGATIVE
URINE ALBUMIN/PROTEIN: NEGATIVE
URINE GLUCOSE: NEGATIVE
URINE GLUCOSE: NEGATIVE
URINE KETONES: NEGATIVE
URINE KETONES: NEGATIVE
WEEKS GESTATION: 34.3
WEEKS GESTATION: 35.3

## 2022-08-02 NOTE — DISCUSSION/SUMMARY
[FreeTextEntry1] : 22\par MFM Consult f/u:\par 46y  @35w3d BERNIE 22 here for f/u visit, referred by Dr. Rajan for gestational diabetes. .\par Denies contractions, vaginal bleeding, leakage of fluid. Perceives fetal movement. \par Had COVID19 5w ago and continues to have residual cough. Denies SOB. Reported mild symptoms at time of infection, not requiring hospitalization. Patient fully vaccinated (Pfizer) but has not gotten booster. \par \par Plans for repeat C/S. \par \par FS log reviewed: \par Fastin-95 (0 out of 36 values elevated) \par 2hr PP: 101-141 (7 out of 106 values elevated)\par  \par Px: Doing well in NAD.  125/80; HR 84; 98oF; 181#\par Abd: Fundus soft, NT. \par Extr: No CCE\par \par Historical Values: \par   \par  3hr GTT 81/232/191/165 \par \par \par Impression/Plan:\par 1. GDMA1 FS today: 84.  Excellent BS control. \par - Previously discussed risks of GDM including but not limited to: macrosomia, shoulder dystocia, increased risk of , stillbirth, NICU admission for hypoglycemia and jaundice, and preeclampsia. Explained that if sugars are well controlled the above complications decrease.  \par - FS goal fasting <95, 2 hour PP<120. Encouraged to document FS.  \par - Discussed increased risk of type II DM later in life and that we will screen for that after pregnancy.  \par -  EFW 859g (18%) \par -->Continue weekly fetal testing once weekly at [facility], once weekly with Dr Rajan.\par \par 2. AMA.  NIPT low risk.  Has not been taking ASA  \par \par 3. HSV serology positive: 36w suppression with Valtrex \par \par 4. Pregnancy:  c/w routine prenatal care with   \par -Plans for repeat C/S, scheduled for \par -PTL precautions \par -PNV  \par - continue growth q4w. \par  \par Dr Rajan 1w; f/u MF 2w. \par \par MD Kiara, FACOG

## 2022-08-03 DIAGNOSIS — Z3A.35 35 WEEKS GESTATION OF PREGNANCY: ICD-10-CM

## 2022-08-03 DIAGNOSIS — O34.219 MATERNAL CARE FOR UNSPECIFIED TYPE SCAR FROM PREVIOUS CESAREAN DELIVERY: ICD-10-CM

## 2022-08-03 DIAGNOSIS — O24.419 GESTATIONAL DIABETES MELLITUS IN PREGNANCY, UNSPECIFIED CONTROL: ICD-10-CM

## 2022-08-03 DIAGNOSIS — O09.522 SUPERVISION OF ELDERLY MULTIGRAVIDA, SECOND TRIMESTER: ICD-10-CM

## 2022-08-04 ENCOUNTER — APPOINTMENT (OUTPATIENT)
Dept: ANTEPARTUM | Facility: CLINIC | Age: 47
End: 2022-08-04

## 2022-08-11 ENCOUNTER — OUTPATIENT (OUTPATIENT)
Dept: OUTPATIENT SERVICES | Facility: HOSPITAL | Age: 47
LOS: 1 days | Discharge: HOME | End: 2022-08-11

## 2022-08-11 ENCOUNTER — APPOINTMENT (OUTPATIENT)
Dept: ANTEPARTUM | Facility: CLINIC | Age: 47
End: 2022-08-11

## 2022-08-11 ENCOUNTER — ASOB RESULT (OUTPATIENT)
Age: 47
End: 2022-08-11

## 2022-08-11 ENCOUNTER — TRANSCRIPTION ENCOUNTER (OUTPATIENT)
Age: 47
End: 2022-08-11

## 2022-08-11 ENCOUNTER — RESULT CHARGE (OUTPATIENT)
Age: 47
End: 2022-08-11

## 2022-08-11 VITALS
DIASTOLIC BLOOD PRESSURE: 81 MMHG | BODY MASS INDEX: 30.66 KG/M2 | OXYGEN SATURATION: 99 % | WEIGHT: 184 LBS | HEART RATE: 92 BPM | SYSTOLIC BLOOD PRESSURE: 123 MMHG | HEIGHT: 65 IN | TEMPERATURE: 98.1 F

## 2022-08-11 DIAGNOSIS — Z98.891 HISTORY OF UTERINE SCAR FROM PREVIOUS SURGERY: Chronic | ICD-10-CM

## 2022-08-11 LAB — GLUCOSE BLDC GLUCOMTR-MCNC: 110 MG/DL — HIGH (ref 70–99)

## 2022-08-11 PROCEDURE — 76819 FETAL BIOPHYS PROFIL W/O NST: CPT | Mod: 26

## 2022-08-11 PROCEDURE — 99213 OFFICE O/P EST LOW 20 MIN: CPT | Mod: 25

## 2022-08-11 NOTE — HISTORY OF PRESENT ILLNESS
[FreeTextEntry1] : 46y  @37w3d BERNIE 22 here for f/u visit, referred by Dr. Rajan for gestational diabetes. \par \par Denies contractions, vaginal bleeding, leakage of fluid. Perceives fetal movement. \par Had COVID19 over a month ago, reports resolution of cough.Denies SOB. Reported mild symptoms at time of infection, not requiring hospitalization. Patient fully vaccinated (Pfizer) but has not gotten booster. \par \par Plans for repeat C/S in 1 week (2022).\par \par FS log reviewed: \par Fastin-83\par 2hr PP:  (9 out of 58 values elevated)\par  \par Px: Doing well in NAD. 125/80; HR 84; 98oF; 181#\par Abd: Fundus soft, NT. \par Extr: No CCE\par \par Historical Values: \par   \par  3hr GTT 81/232/191/165 \par

## 2022-08-11 NOTE — PHYSICAL EXAM
[FreeTextEntry1] : General: In no apparent distress. \par \par HEENT:  Atraumatic.  Extraocular muscles intact.  \par \par Cardiovascular: Regular rate and rhythm, normal S1/S2 \par \par Pulmonary: Clear to auscultation bilaterally.  No wheezing. \par \par Abdomen: soft, gravid, nontender, nondistended, no rebound, no guarding \par \par Extremities: no calf tenderness or edema \par \par Neurology: +2 reflexes in bilateral upper extremities \par \par Psychiatry:  Happy mood.  Awake, alert, oriented to person, place, time. \par

## 2022-08-11 NOTE — DISCUSSION/SUMMARY
[FreeTextEntry1] : 46y  @37w3d BERNIE 22 here for f/u visit, referred by Dr. Rajan for gestational diabetes. \par \par 1. GDMA1 FS today: 110 (1.5hr after beef soup). Excellent BS control. \par - Previously discussed risks of GDM including but not limited to: macrosomia, shoulder dystocia, increased risk of , stillbirth, NICU admission for hypoglycemia and jaundice, and preeclampsia. Explained that if sugars are well controlled the above complications decrease. \par - FS goal fasting <95, 2 hour PP<120. Encouraged to document FS. \par - Previously discussed increased risk of type II DM later in life and that we will screen for that after pregnancy. \par -  EFW 2308g (13%) \par \par 2. AMA. NIPT low risk. Has not been taking ASA \par \par 3. HSV serology positive: 36w suppression with Valtrex, patient has not been taking\par \par 4. Pregnancy: c/w routine prenatal care with  \par -Plans for repeat C/S, scheduled for \par -Labor precautions \par -PNV \par \par MD Kiara, FACOG

## 2022-08-15 DIAGNOSIS — O09.522 SUPERVISION OF ELDERLY MULTIGRAVIDA, SECOND TRIMESTER: ICD-10-CM

## 2022-08-15 DIAGNOSIS — O24.419 GESTATIONAL DIABETES MELLITUS IN PREGNANCY, UNSPECIFIED CONTROL: ICD-10-CM

## 2022-08-15 DIAGNOSIS — O34.219 MATERNAL CARE FOR UNSPECIFIED TYPE SCAR FROM PREVIOUS CESAREAN DELIVERY: ICD-10-CM

## 2022-08-15 DIAGNOSIS — Z3A.37 37 WEEKS GESTATION OF PREGNANCY: ICD-10-CM

## 2022-08-15 DIAGNOSIS — O09.90 SUPERVISION OF HIGH RISK PREGNANCY, UNSPECIFIED, UNSPECIFIED TRIMESTER: ICD-10-CM

## 2022-08-18 ENCOUNTER — TRANSCRIPTION ENCOUNTER (OUTPATIENT)
Age: 47
End: 2022-08-18

## 2022-08-18 ENCOUNTER — INPATIENT (INPATIENT)
Facility: HOSPITAL | Age: 47
LOS: 1 days | Discharge: HOME | End: 2022-08-20
Attending: OBSTETRICS & GYNECOLOGY | Admitting: OBSTETRICS & GYNECOLOGY

## 2022-08-18 ENCOUNTER — APPOINTMENT (OUTPATIENT)
Dept: ANTEPARTUM | Facility: CLINIC | Age: 47
End: 2022-08-18

## 2022-08-18 ENCOUNTER — RESULT REVIEW (OUTPATIENT)
Age: 47
End: 2022-08-18

## 2022-08-18 VITALS — HEART RATE: 95 BPM | DIASTOLIC BLOOD PRESSURE: 88 MMHG | SYSTOLIC BLOOD PRESSURE: 141 MMHG

## 2022-08-18 DIAGNOSIS — Z98.891 HISTORY OF UTERINE SCAR FROM PREVIOUS SURGERY: Chronic | ICD-10-CM

## 2022-08-18 LAB
ALBUMIN SERPL ELPH-MCNC: 3 G/DL — LOW (ref 3.5–5.2)
ALP SERPL-CCNC: 182 U/L — HIGH (ref 30–115)
ALT FLD-CCNC: 21 U/L — SIGNIFICANT CHANGE UP (ref 0–41)
AMPHET UR-MCNC: NEGATIVE — SIGNIFICANT CHANGE UP
ANION GAP SERPL CALC-SCNC: 11 MMOL/L — SIGNIFICANT CHANGE UP (ref 7–14)
APPEARANCE UR: ABNORMAL
AST SERPL-CCNC: 30 U/L — SIGNIFICANT CHANGE UP (ref 0–41)
BACTERIA # UR AUTO: ABNORMAL
BARBITURATES UR SCN-MCNC: NEGATIVE — SIGNIFICANT CHANGE UP
BASOPHILS # BLD AUTO: 0.04 K/UL — SIGNIFICANT CHANGE UP (ref 0–0.2)
BASOPHILS NFR BLD AUTO: 0.3 % — SIGNIFICANT CHANGE UP (ref 0–1)
BENZODIAZ UR-MCNC: NEGATIVE — SIGNIFICANT CHANGE UP
BILIRUB SERPL-MCNC: 0.4 MG/DL — SIGNIFICANT CHANGE UP (ref 0.2–1.2)
BILIRUB UR-MCNC: NEGATIVE — SIGNIFICANT CHANGE UP
BLD GP AB SCN SERPL QL: SIGNIFICANT CHANGE UP
BUN SERPL-MCNC: 13 MG/DL — SIGNIFICANT CHANGE UP (ref 10–20)
BUPRENORPHINE SCREEN, URINE RESULT: NEGATIVE — SIGNIFICANT CHANGE UP
CALCIUM SERPL-MCNC: 8.7 MG/DL — SIGNIFICANT CHANGE UP (ref 8.5–10.1)
CHLORIDE SERPL-SCNC: 108 MMOL/L — SIGNIFICANT CHANGE UP (ref 98–110)
CO2 SERPL-SCNC: 18 MMOL/L — SIGNIFICANT CHANGE UP (ref 17–32)
COCAINE METAB.OTHER UR-MCNC: NEGATIVE — SIGNIFICANT CHANGE UP
COLOR SPEC: YELLOW — SIGNIFICANT CHANGE UP
CREAT ?TM UR-MCNC: 81 MG/DL — SIGNIFICANT CHANGE UP
CREAT SERPL-MCNC: 0.7 MG/DL — SIGNIFICANT CHANGE UP (ref 0.7–1.5)
DIFF PNL FLD: NEGATIVE — SIGNIFICANT CHANGE UP
EGFR: 109 ML/MIN/1.73M2 — SIGNIFICANT CHANGE UP
EOSINOPHIL # BLD AUTO: 0.03 K/UL — SIGNIFICANT CHANGE UP (ref 0–0.7)
EOSINOPHIL NFR BLD AUTO: 0.3 % — SIGNIFICANT CHANGE UP (ref 0–8)
EPI CELLS # UR: 23 /HPF — HIGH (ref 0–5)
FENTANYL UR QL: NEGATIVE — SIGNIFICANT CHANGE UP
GLUCOSE BLDC GLUCOMTR-MCNC: 83 MG/DL — SIGNIFICANT CHANGE UP (ref 70–99)
GLUCOSE SERPL-MCNC: 75 MG/DL — SIGNIFICANT CHANGE UP (ref 70–99)
GLUCOSE UR QL: NEGATIVE — SIGNIFICANT CHANGE UP
HCT VFR BLD CALC: 34.2 % — LOW (ref 37–47)
HGB BLD-MCNC: 11.5 G/DL — LOW (ref 12–16)
HYALINE CASTS # UR AUTO: 2 /LPF — SIGNIFICANT CHANGE UP (ref 0–7)
IMM GRANULOCYTES NFR BLD AUTO: 1.2 % — HIGH (ref 0.1–0.3)
KETONES UR-MCNC: NEGATIVE — SIGNIFICANT CHANGE UP
L&D DRUG SCREEN, URINE: SIGNIFICANT CHANGE UP
LDH SERPL L TO P-CCNC: 245 — HIGH (ref 50–242)
LEUKOCYTE ESTERASE UR-ACNC: NEGATIVE — SIGNIFICANT CHANGE UP
LYMPHOCYTES # BLD AUTO: 19.6 % — LOW (ref 20.5–51.1)
LYMPHOCYTES # BLD AUTO: 2.29 K/UL — SIGNIFICANT CHANGE UP (ref 1.2–3.4)
MCHC RBC-ENTMCNC: 28 PG — SIGNIFICANT CHANGE UP (ref 27–31)
MCHC RBC-ENTMCNC: 33.6 G/DL — SIGNIFICANT CHANGE UP (ref 32–37)
MCV RBC AUTO: 83.2 FL — SIGNIFICANT CHANGE UP (ref 81–99)
METHADONE UR-MCNC: NEGATIVE — SIGNIFICANT CHANGE UP
MONOCYTES # BLD AUTO: 0.96 K/UL — HIGH (ref 0.1–0.6)
MONOCYTES NFR BLD AUTO: 8.2 % — SIGNIFICANT CHANGE UP (ref 1.7–9.3)
NEUTROPHILS # BLD AUTO: 8.23 K/UL — HIGH (ref 1.4–6.5)
NEUTROPHILS NFR BLD AUTO: 70.4 % — SIGNIFICANT CHANGE UP (ref 42.2–75.2)
NITRITE UR-MCNC: NEGATIVE — SIGNIFICANT CHANGE UP
NRBC # BLD: 0 /100 WBCS — SIGNIFICANT CHANGE UP (ref 0–0)
OPIATES UR-MCNC: NEGATIVE — SIGNIFICANT CHANGE UP
OXYCODONE UR-MCNC: NEGATIVE — SIGNIFICANT CHANGE UP
PCP UR-MCNC: NEGATIVE — SIGNIFICANT CHANGE UP
PH UR: 6.5 — SIGNIFICANT CHANGE UP (ref 5–8)
PLATELET # BLD AUTO: 238 K/UL — SIGNIFICANT CHANGE UP (ref 130–400)
POTASSIUM SERPL-MCNC: 4.2 MMOL/L — SIGNIFICANT CHANGE UP (ref 3.5–5)
POTASSIUM SERPL-SCNC: 4.2 MMOL/L — SIGNIFICANT CHANGE UP (ref 3.5–5)
PRENATAL SYPHILIS TEST: SIGNIFICANT CHANGE UP
PROPOXYPHENE QUALITATIVE URINE RESULT: NEGATIVE — SIGNIFICANT CHANGE UP
PROT ?TM UR-MCNC: 32 MG/DLG/24H — SIGNIFICANT CHANGE UP
PROT SERPL-MCNC: 5.8 G/DL — LOW (ref 6–8)
PROT UR-MCNC: ABNORMAL
PROT/CREAT UR-RTO: 0.4 RATIO — HIGH (ref 0–0.2)
RBC # BLD: 4.11 M/UL — LOW (ref 4.2–5.4)
RBC # FLD: 15 % — HIGH (ref 11.5–14.5)
RBC CASTS # UR COMP ASSIST: 4 /HPF — SIGNIFICANT CHANGE UP (ref 0–4)
SODIUM SERPL-SCNC: 137 MMOL/L — SIGNIFICANT CHANGE UP (ref 135–146)
SP GR SPEC: 1.02 — SIGNIFICANT CHANGE UP (ref 1.01–1.03)
URATE SERPL-MCNC: 4.8 MG/DL — SIGNIFICANT CHANGE UP (ref 2.5–7)
UROBILINOGEN FLD QL: SIGNIFICANT CHANGE UP
WBC # BLD: 11.69 K/UL — HIGH (ref 4.8–10.8)
WBC # FLD AUTO: 11.69 K/UL — HIGH (ref 4.8–10.8)
WBC UR QL: 9 /HPF — HIGH (ref 0–5)

## 2022-08-18 PROCEDURE — 88302 TISSUE EXAM BY PATHOLOGIST: CPT | Mod: 26

## 2022-08-18 PROCEDURE — 88307 TISSUE EXAM BY PATHOLOGIST: CPT | Mod: 26

## 2022-08-18 RX ORDER — FAMOTIDINE 10 MG/ML
20 INJECTION INTRAVENOUS ONCE
Refills: 0 | Status: COMPLETED | OUTPATIENT
Start: 2022-08-18 | End: 2022-08-18

## 2022-08-18 RX ORDER — ACETAMINOPHEN 500 MG
975 TABLET ORAL
Refills: 0 | Status: DISCONTINUED | OUTPATIENT
Start: 2022-08-18 | End: 2022-08-20

## 2022-08-18 RX ORDER — LANOLIN
1 OINTMENT (GRAM) TOPICAL EVERY 6 HOURS
Refills: 0 | Status: DISCONTINUED | OUTPATIENT
Start: 2022-08-18 | End: 2022-08-20

## 2022-08-18 RX ORDER — OXYTOCIN 10 UNIT/ML
333.33 VIAL (ML) INJECTION
Qty: 20 | Refills: 0 | Status: DISCONTINUED | OUTPATIENT
Start: 2022-08-18 | End: 2022-08-20

## 2022-08-18 RX ORDER — KETOROLAC TROMETHAMINE 30 MG/ML
30 SYRINGE (ML) INJECTION EVERY 6 HOURS
Refills: 0 | Status: DISCONTINUED | OUTPATIENT
Start: 2022-08-18 | End: 2022-08-19

## 2022-08-18 RX ORDER — SODIUM CHLORIDE 9 MG/ML
1000 INJECTION, SOLUTION INTRAVENOUS ONCE
Refills: 0 | Status: COMPLETED | OUTPATIENT
Start: 2022-08-18 | End: 2022-08-18

## 2022-08-18 RX ORDER — OXYCODONE HYDROCHLORIDE 5 MG/1
5 TABLET ORAL
Refills: 0 | Status: DISCONTINUED | OUTPATIENT
Start: 2022-08-18 | End: 2022-08-20

## 2022-08-18 RX ORDER — CITRIC ACID/SODIUM CITRATE 300-500 MG
30 SOLUTION, ORAL ORAL ONCE
Refills: 0 | Status: COMPLETED | OUTPATIENT
Start: 2022-08-18 | End: 2022-08-18

## 2022-08-18 RX ORDER — TETANUS TOXOID, REDUCED DIPHTHERIA TOXOID AND ACELLULAR PERTUSSIS VACCINE, ADSORBED 5; 2.5; 8; 8; 2.5 [IU]/.5ML; [IU]/.5ML; UG/.5ML; UG/.5ML; UG/.5ML
0.5 SUSPENSION INTRAMUSCULAR ONCE
Refills: 0 | Status: DISCONTINUED | OUTPATIENT
Start: 2022-08-18 | End: 2022-08-20

## 2022-08-18 RX ORDER — MAGNESIUM HYDROXIDE 400 MG/1
30 TABLET, CHEWABLE ORAL
Refills: 0 | Status: DISCONTINUED | OUTPATIENT
Start: 2022-08-18 | End: 2022-08-20

## 2022-08-18 RX ORDER — SIMETHICONE 80 MG/1
80 TABLET, CHEWABLE ORAL EVERY 4 HOURS
Refills: 0 | Status: DISCONTINUED | OUTPATIENT
Start: 2022-08-18 | End: 2022-08-20

## 2022-08-18 RX ORDER — SODIUM CHLORIDE 9 MG/ML
1000 INJECTION, SOLUTION INTRAVENOUS
Refills: 0 | Status: DISCONTINUED | OUTPATIENT
Start: 2022-08-18 | End: 2022-08-20

## 2022-08-18 RX ORDER — ACETAMINOPHEN 500 MG
1000 TABLET ORAL ONCE
Refills: 0 | Status: COMPLETED | OUTPATIENT
Start: 2022-08-18 | End: 2022-08-18

## 2022-08-18 RX ORDER — OXYCODONE HYDROCHLORIDE 5 MG/1
5 TABLET ORAL ONCE
Refills: 0 | Status: DISCONTINUED | OUTPATIENT
Start: 2022-08-18 | End: 2022-08-20

## 2022-08-18 RX ORDER — DIPHENHYDRAMINE HCL 50 MG
25 CAPSULE ORAL EVERY 6 HOURS
Refills: 0 | Status: DISCONTINUED | OUTPATIENT
Start: 2022-08-18 | End: 2022-08-20

## 2022-08-18 RX ORDER — SODIUM CHLORIDE 9 MG/ML
1000 INJECTION, SOLUTION INTRAVENOUS
Refills: 0 | Status: DISCONTINUED | OUTPATIENT
Start: 2022-08-18 | End: 2022-08-18

## 2022-08-18 RX ORDER — OXYTOCIN 10 UNIT/ML
333.33 VIAL (ML) INJECTION
Qty: 20 | Refills: 0 | Status: DISCONTINUED | OUTPATIENT
Start: 2022-08-18 | End: 2022-08-18

## 2022-08-18 RX ORDER — METOCLOPRAMIDE HCL 10 MG
10 TABLET ORAL ONCE
Refills: 0 | Status: COMPLETED | OUTPATIENT
Start: 2022-08-18 | End: 2022-08-18

## 2022-08-18 RX ORDER — FERROUS SULFATE 325(65) MG
325 TABLET ORAL DAILY
Refills: 0 | Status: DISCONTINUED | OUTPATIENT
Start: 2022-08-18 | End: 2022-08-20

## 2022-08-18 RX ORDER — CEFAZOLIN SODIUM 1 G
2000 VIAL (EA) INJECTION ONCE
Refills: 0 | Status: COMPLETED | OUTPATIENT
Start: 2022-08-18 | End: 2022-08-18

## 2022-08-18 RX ORDER — IBUPROFEN 200 MG
600 TABLET ORAL EVERY 6 HOURS
Refills: 0 | Status: COMPLETED | OUTPATIENT
Start: 2022-08-18 | End: 2023-07-17

## 2022-08-18 RX ADMIN — SODIUM CHLORIDE 1000 MILLILITER(S): 9 INJECTION, SOLUTION INTRAVENOUS at 17:21

## 2022-08-18 RX ADMIN — Medication 100 MILLIGRAM(S): at 13:48

## 2022-08-18 RX ADMIN — Medication 400 MILLIGRAM(S): at 17:42

## 2022-08-18 RX ADMIN — SODIUM CHLORIDE 2000 MILLILITER(S): 9 INJECTION, SOLUTION INTRAVENOUS at 10:00

## 2022-08-18 RX ADMIN — Medication 30 MILLILITER(S): at 10:35

## 2022-08-18 RX ADMIN — FAMOTIDINE 20 MILLIGRAM(S): 10 INJECTION INTRAVENOUS at 10:35

## 2022-08-18 RX ADMIN — Medication 10 MILLIGRAM(S): at 10:35

## 2022-08-18 NOTE — OB PROVIDER H&P - HISTORY OF PRESENT ILLNESS
47yo  at 38w3d by LMP c/w sono presenting for scheduled repeat c/s. Denies CTX, LOF, VB. Denies HA, dizziness, scotomata, SOB, palpitations, chest pain, RUQ/epigastric pain, LE swelling. Good FM. Pregnancy is complicated by GDMA1, FFS and PPFS. 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     Pt was evaluated in L&D on 2022 and 2022 with elevated blood pressures. She did not meet criteria for PIH, preeclamptic labs were wnl. 47yo  at 38w3d by LMP c/w sono presenting for scheduled repeat c/s. Denies CTX, LOF, VB. Denies HA, dizziness, scotomata, SOB, palpitations, chest pain, RUQ/epigastric pain, LE swelling. Good FM. Pregnancy is complicated by GDMA1, FFS 70-90 and PPFS . 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     Pt was evaluated in L&D on 2022 and 2022 with elevated blood pressures. She did not meet criteria for PIH, preeclamptic labs were wnl.

## 2022-08-18 NOTE — OB PROVIDER H&P - ASSESSMENT
46y G 46y  at 38w3d, GBS neg, HSV-1 serology positive, GDMA1, for scheduled repeat c/s #2,   -admit to L&D  -admission labs and preeclamptic labs  -NPO/IVF  -asncef 2g  -bicitra 30cc, pepcid 20mg, reglan 10mg  -EFM/TOCO  -regulo Rajan and Dr. Antony aware

## 2022-08-18 NOTE — CHART NOTE - NSCHARTNOTEFT_GEN_A_CORE
PACU ANESTHESIA ADMISSION NOTE      Procedure: CD tubal ligation  Post op diagnosis:  IUP    ____  Intubated  TV:______       Rate: ______      FiO2: ______    __x__  Patent Airway    __x__  Full return of protective reflexes    __x__  Full recovery from anesthesia / back to baseline status    Vitals:  T(C): 36.8 (08-18-22 @ 08:43), Max: 36.8 (08-18-22 @ 08:27)  HR: 68 (08-18-22 @ 16:06) (68 - 113)  BP: 129/70 (08-18-22 @ 16:06) (117/73 - 163/87)  RR: 18 (08-18-22 @ 09:27) (14 - 18)  SpO2: 99% (08-18-22 @ 16:06) (99% - 99%)    Mental Status:  __x__ Awake   ___x__ Alert   _____ Drowsy   _____ Sedated    Nausea/Vomiting:  __x__ NO  ______Yes,   See Post - Op Orders          Pain Scale (0-10):  _____    Treatment: ____ None    __x__ See Post - Op/PCA Orders    Post - Operative Fluids:   ____ Oral   __x__ See Post - Op Orders    Plan: Discharge:   ____Home       _____Floor     _____Critical Care    _____  Other:_________________    Comments: Patient had smooth intraoperative event, no anesthesia complication.  PACU Vital signs: HR:     65       BP:   120     /    70      RR:             O2 Sat:   100    %     Temp  spinal  [id number]    [id number]

## 2022-08-18 NOTE — OB PROVIDER H&P - NSHPPHYSICALEXAM_GEN_ALL_CORE
T(C): 36.8 (18 Aug 2022 08:27), Max: 36.8 (18 Aug 2022 08:27)  T(F): 98.2 (18 Aug 2022 08:27), Max: 98.2 (18 Aug 2022 08:27)  HR: 95 (18 Aug 2022 08:25) (95 - 95)  BP: 141/88 (18 Aug 2022 08:25) (141/88 - 141/88)      Gen: A+OX3. NAD  Abd: Soft, Nontender. Gravid. No palpable contractions  SVE: deferred    FHR: 135BPM/mod scott/accel pos  TOCO: none      EFW by Leopolds: [id number]

## 2022-08-18 NOTE — PROGRESS NOTE ADULT - ASSESSMENT
A/P: 46y now P2, s/p repeat c/s#2, GDMA1, preeclampsia without severe features, pod#0, recovering well.     - monitor blood pressures   - pain management with PO pain meds   - monitor vitals/bleeding   - encourage incentive spirometry   - ambulation/PO hydration  - advance diet as tolerated   - simethicone  - SCDs/lovenox for DVT prophylaxis   - f/u AM labs   - routine postop care     Dr. Damian and Dr. Rajan to be made aware.  A/P: 46y now P2, s/p repeat c/s#2, GDMA1, preeclampsia without severe features, pod#0, recovering well.     - monitor blood pressures   - pain management with PO pain meds   - monitor vitals/bleeding   - encourage incentive spirometry   - ambulation/PO hydration  - advance diet as tolerated   - simethicone  - SCDs for DVT prophylaxis   - f/u AM labs   - routine postop care     Dr. Damian and Dr. Rajan to be made aware.

## 2022-08-18 NOTE — BRIEF OPERATIVE NOTE - NSICDXBRIEFPREOP_GEN_ALL_CORE_FT
PRE-OP DIAGNOSIS:  Term pregnancy 18-Aug-2022 16:17:27  Leisa Newberry  Gestational diabetes 18-Aug-2022 16:17:34  Leisa Newberry  Gestational hypertension 18-Aug-2022 16:17:44  Leisa Newberry (advanced maternal age) multigravida 35+ 18-Aug-2022 16:18:30  Leisa Newberry

## 2022-08-18 NOTE — BRIEF OPERATIVE NOTE - OPERATION/FINDINGS
Low transverse uterine incision. Dense adhesions to anterior uterus and fascia. Otherwise normal-appearing uterus, tubes and ovaries bilaterally.  Clear amniotic fluid. Female  delivered in vertex position, APGARs 9/9, weighing 2770g.

## 2022-08-18 NOTE — PRE-ANESTHESIA EVALUATION ADULT - NSANTHPMHFT_GEN_ALL_CORE
History of Present Illness	  *GDM, Diet controlled    45yo  at 38w3d by LMP c/w sono presenting for scheduled repeat c/s. Denies CTX, LOF, VB. Denies HA, dizziness, scotomata, SOB, palpitations, chest pain, RUQ/epigastric pain, LE swelling. Good FM. Pregnancy is complicated by GDMA1, FFS and PPFS. 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     Pt was evaluated in L&D on 2022 and 2022 with elevated blood pressures. She did not meet criteria for PIH, preeclamptic labs were wnl.

## 2022-08-18 NOTE — OB RN PATIENT PROFILE - INTERNATIONAL TRAVEL
[facility]  [facility] government office in [place], [place]  Address: [address] [place] [address] [address], [place] [address]  Hours: Open today · 7AM-5:30PM  Phone: [phone]   No

## 2022-08-18 NOTE — PROGRESS NOTE ADULT - SUBJECTIVE AND OBJECTIVE BOX
PGY1 Note:  Section    Pt seen and evaluated at bedside. Pain well controlled. Denies dizziness/lightheadedness/CP/SOB/palpitations. Denies fever, chills, nausea/vomiting, diarrhea, dysuria, LE pain.     Ambulating: Not yet attempted  Voiding: Iniguez in place  Flatus: No  Bowel movements: No   Breast or bottle feeding:  No    Diet: not yet attempted     Physical Exam  Vital Signs Last 24 Hrs  T(C): 36.3 (18 Aug 2022 19:05), Max: 36.8 (18 Aug 2022 08:27)  T(F): 97.3 (18 Aug 2022 19:05), Max: 98.2 (18 Aug 2022 08:27)  HR: 71 (18 Aug 2022 19:05) (66 - 113)  BP: 142/73 (18 Aug 2022 19:05) (117/73 - 163/87)  RR: 18 (18 Aug 2022 19:05) (14 - 18)  SpO2: 98% (18 Aug 2022 18:51) (97% - 100%)      Gen: AAOx3, NAD  Heart: RRR, S1S2+  Lungs: CTA B/L, no r/r/w   Fundus: firm, at level of umbilicus   Wound: Pfannenstiel incision, dermabond c/d/i   Abd: Soft, appropriately tender, nondistended, BS+  Lochia: minimal   Ext: No calf tenderness, no swelling    UO (min 41cc/hr): (1600) 100, (4222-9024) 5-->1L bolus, (5043-3761) 60cc (6130-0193) 100cc    Labs:                        11.5   11.69 )-----------( 238      ( 18 Aug 2022 09:09 )             34.2        MEDICATIONS  (STANDING):  acetaminophen     Tablet .. 975 milliGRAM(s) Oral <User Schedule>  diphtheria/tetanus/pertussis (acellular) Vaccine (ADAcel) 0.5 milliLiter(s) IntraMuscular once  ferrous    sulfate 325 milliGRAM(s) Oral daily  ibuprofen  Tablet. 600 milliGRAM(s) Oral every 6 hours  ketorolac   Injectable 30 milliGRAM(s) IV Push every 6 hours  lactated ringers. 1000 milliLiter(s) (125 mL/Hr) IV Continuous <Continuous>  oxytocin Infusion 333.333 milliUNIT(s)/Min (1000 mL/Hr) IV Continuous <Continuous>  prenatal multivitamin 1 Tablet(s) Oral daily    MEDICATIONS  (PRN):  diphenhydrAMINE 25 milliGRAM(s) Oral every 6 hours PRN Pruritus  lanolin Ointment 1 Application(s) Topical every 6 hours PRN Sore Nipples  magnesium hydroxide Suspension 30 milliLiter(s) Oral two times a day PRN Constipation  oxyCODONE    IR 5 milliGRAM(s) Oral every 3 hours PRN Moderate to Severe Pain (4-10)  oxyCODONE    IR 5 milliGRAM(s) Oral once PRN Moderate to Severe Pain (4-10)  simethicone 80 milliGRAM(s) Chew every 4 hours PRN Gas      A/P: S/P C/S (type) (#) POD  , recovering well   - pain management with PO pain meds   - monitor vitals/bleeding   - encourage incentive spirometry   - ambulation/PO hydration  - advance diet as tolerated   - simethicone  - SCDs/lovenox for DVT prophylaxis   - f/u AM labs   - routine postop care     Will inform attending  PGY1 Note:  Section    Pt seen and evaluated at bedside. Pain well controlled. Denies dizziness/lightheadedness/CP/SOB/palpitations. Denies fever, chills, nausea/vomiting, diarrhea, dysuria, LE pain.     Ambulating: Not yet attempted  Voiding: Iniguez in place  Flatus: No  Bowel movements: No   Breast or bottle feeding:  No    Diet: not yet attempted     Physical Exam  Vital Signs Last 24 Hrs  T(C): 36.3 (18 Aug 2022 19:05), Max: 36.8 (18 Aug 2022 08:27)  T(F): 97.3 (18 Aug 2022 19:05), Max: 98.2 (18 Aug 2022 08:27)  HR: 71 (18 Aug 2022 19:05) (66 - 113)  BP: 142/73 (18 Aug 2022 19:05) (117/73 - 163/87)  RR: 18 (18 Aug 2022 19:05) (14 - 18)  SpO2: 98% (18 Aug 2022 18:51) (97% - 100%)      Gen: AAOx3, NAD  Heart: RRR, S1S2+  Lungs: CTA B/L, no r/r/w   Fundus: firm, at level of umbilicus   Wound: Pfannenstiel incision, dermabond c/d/i   Abd: Soft, appropriately tender, nondistended, BS+  Lochia: minimal   Ext: No calf tenderness, no swelling    UO (min 41cc/hr): (1600) 100, (1526-1284) 5-->1L bolus, (8868-9865) 60cc (9317-9523) 100cc    Labs:                        11.5   11.69 )-----------( 238      ( 18 Aug 2022 09:09 )             34.2        MEDICATIONS  (STANDING):  acetaminophen     Tablet .. 975 milliGRAM(s) Oral <User Schedule>  diphtheria/tetanus/pertussis (acellular) Vaccine (ADAcel) 0.5 milliLiter(s) IntraMuscular once  ferrous    sulfate 325 milliGRAM(s) Oral daily  ibuprofen  Tablet. 600 milliGRAM(s) Oral every 6 hours  ketorolac   Injectable 30 milliGRAM(s) IV Push every 6 hours  lactated ringers. 1000 milliLiter(s) (125 mL/Hr) IV Continuous <Continuous>  oxytocin Infusion 333.333 milliUNIT(s)/Min (1000 mL/Hr) IV Continuous <Continuous>  prenatal multivitamin 1 Tablet(s) Oral daily    MEDICATIONS  (PRN):  diphenhydrAMINE 25 milliGRAM(s) Oral every 6 hours PRN Pruritus  lanolin Ointment 1 Application(s) Topical every 6 hours PRN Sore Nipples  magnesium hydroxide Suspension 30 milliLiter(s) Oral two times a day PRN Constipation  oxyCODONE    IR 5 milliGRAM(s) Oral every 3 hours PRN Moderate to Severe Pain (4-10)  oxyCODONE    IR 5 milliGRAM(s) Oral once PRN Moderate to Severe Pain (4-10)  simethicone 80 milliGRAM(s) Chew every 4 hours PRN Gas

## 2022-08-18 NOTE — BRIEF OPERATIVE NOTE - NSICDXBRIEFPOSTOP_GEN_ALL_CORE_FT
POST-OP DIAGNOSIS:  Gestational hypertension 18-Aug-2022 16:17:55  Leisa Newberry (advanced maternal age) multigravida 35+ 18-Aug-2022 16:18:37  Leisa Newberry  Gestational diabetes 18-Aug-2022 16:17:50  Leisa Newberry  Term pregnancy 18-Aug-2022 16:17:47  Leisa Newberry

## 2022-08-18 NOTE — OB PROVIDER H&P - NSHPLABSRESULTS_GEN_ALL_CORE
sonos:  37w3d FH+, vtx, posterior placenta, MVP 5.89cm, BPP 8/8  35w3d vtx, posterior placenta, MVP 6.84cm, BPP 8/8, EFW 2308 13%  26.3 +FHR, MVP 6.9cm, EFW 859g         GTT 81/232/191/165

## 2022-08-18 NOTE — OB RN PATIENT PROFILE - FALL HARM RISK - UNIVERSAL INTERVENTIONS
Bed in lowest position, wheels locked, appropriate side rails in place/Call bell, personal items and telephone in reach/Instruct patient to call for assistance before getting out of bed or chair/Non-slip footwear when patient is out of bed/[place] to call system/Physically safe environment - no spills, clutter or unnecessary equipment/Purposeful Proactive Rounding/Room/bathroom lighting operational, light cord in reach

## 2022-08-19 LAB
BASOPHILS # BLD AUTO: 0.03 K/UL — SIGNIFICANT CHANGE UP (ref 0–0.2)
BASOPHILS # BLD AUTO: 0.04 K/UL — SIGNIFICANT CHANGE UP (ref 0–0.2)
BASOPHILS NFR BLD AUTO: 0.2 % — SIGNIFICANT CHANGE UP (ref 0–1)
BASOPHILS NFR BLD AUTO: 0.3 % — SIGNIFICANT CHANGE UP (ref 0–1)
EOSINOPHIL # BLD AUTO: 0 K/UL — SIGNIFICANT CHANGE UP (ref 0–0.7)
EOSINOPHIL # BLD AUTO: 0.01 K/UL — SIGNIFICANT CHANGE UP (ref 0–0.7)
EOSINOPHIL NFR BLD AUTO: 0 % — SIGNIFICANT CHANGE UP (ref 0–8)
EOSINOPHIL NFR BLD AUTO: 0.1 % — SIGNIFICANT CHANGE UP (ref 0–8)
HCT VFR BLD CALC: 22.2 % — LOW (ref 37–47)
HCT VFR BLD CALC: 24.2 % — LOW (ref 37–47)
HGB BLD-MCNC: 7.3 G/DL — LOW (ref 12–16)
HGB BLD-MCNC: 7.8 G/DL — LOW (ref 12–16)
IMM GRANULOCYTES NFR BLD AUTO: 1 % — HIGH (ref 0.1–0.3)
IMM GRANULOCYTES NFR BLD AUTO: 1.3 % — HIGH (ref 0.1–0.3)
LYMPHOCYTES # BLD AUTO: 19.9 % — LOW (ref 20.5–51.1)
LYMPHOCYTES # BLD AUTO: 2.74 K/UL — SIGNIFICANT CHANGE UP (ref 1.2–3.4)
LYMPHOCYTES # BLD AUTO: 20 % — LOW (ref 20.5–51.1)
LYMPHOCYTES # BLD AUTO: 3.12 K/UL — SIGNIFICANT CHANGE UP (ref 1.2–3.4)
MCHC RBC-ENTMCNC: 27.1 PG — SIGNIFICANT CHANGE UP (ref 27–31)
MCHC RBC-ENTMCNC: 27.3 PG — SIGNIFICANT CHANGE UP (ref 27–31)
MCHC RBC-ENTMCNC: 32.2 G/DL — SIGNIFICANT CHANGE UP (ref 32–37)
MCHC RBC-ENTMCNC: 32.9 G/DL — SIGNIFICANT CHANGE UP (ref 32–37)
MCV RBC AUTO: 83.1 FL — SIGNIFICANT CHANGE UP (ref 81–99)
MCV RBC AUTO: 84 FL — SIGNIFICANT CHANGE UP (ref 81–99)
MONOCYTES # BLD AUTO: 1.17 K/UL — HIGH (ref 0.1–0.6)
MONOCYTES # BLD AUTO: 1.55 K/UL — HIGH (ref 0.1–0.6)
MONOCYTES NFR BLD AUTO: 8.5 % — SIGNIFICANT CHANGE UP (ref 1.7–9.3)
MONOCYTES NFR BLD AUTO: 9.9 % — HIGH (ref 1.7–9.3)
NEUTROPHILS # BLD AUTO: 10.72 K/UL — HIGH (ref 1.4–6.5)
NEUTROPHILS # BLD AUTO: 9.64 K/UL — HIGH (ref 1.4–6.5)
NEUTROPHILS NFR BLD AUTO: 68.6 % — SIGNIFICANT CHANGE UP (ref 42.2–75.2)
NEUTROPHILS NFR BLD AUTO: 70.2 % — SIGNIFICANT CHANGE UP (ref 42.2–75.2)
NRBC # BLD: 0 /100 WBCS — SIGNIFICANT CHANGE UP (ref 0–0)
NRBC # BLD: 0 /100 WBCS — SIGNIFICANT CHANGE UP (ref 0–0)
PLATELET # BLD AUTO: 239 K/UL — SIGNIFICANT CHANGE UP (ref 130–400)
PLATELET # BLD AUTO: 249 K/UL — SIGNIFICANT CHANGE UP (ref 130–400)
RBC # BLD: 2.67 M/UL — LOW (ref 4.2–5.4)
RBC # BLD: 2.88 M/UL — LOW (ref 4.2–5.4)
RBC # FLD: 14.8 % — HIGH (ref 11.5–14.5)
RBC # FLD: 15.1 % — HIGH (ref 11.5–14.5)
WBC # BLD: 13.74 K/UL — HIGH (ref 4.8–10.8)
WBC # BLD: 15.62 K/UL — HIGH (ref 4.8–10.8)
WBC # FLD AUTO: 13.74 K/UL — HIGH (ref 4.8–10.8)
WBC # FLD AUTO: 15.62 K/UL — HIGH (ref 4.8–10.8)

## 2022-08-19 RX ORDER — IBUPROFEN 200 MG
600 TABLET ORAL EVERY 6 HOURS
Refills: 0 | Status: DISCONTINUED | OUTPATIENT
Start: 2022-08-19 | End: 2022-08-20

## 2022-08-19 RX ORDER — SODIUM CHLORIDE 9 MG/ML
1000 INJECTION, SOLUTION INTRAVENOUS ONCE
Refills: 0 | Status: COMPLETED | OUTPATIENT
Start: 2022-08-19 | End: 2022-08-19

## 2022-08-19 RX ORDER — IRON SUCROSE 20 MG/ML
200 INJECTION, SOLUTION INTRAVENOUS ONCE
Refills: 0 | Status: COMPLETED | OUTPATIENT
Start: 2022-08-19 | End: 2022-08-19

## 2022-08-19 RX ORDER — IRON SUCROSE 20 MG/ML
200 INJECTION, SOLUTION INTRAVENOUS ONCE
Refills: 0 | Status: DISCONTINUED | OUTPATIENT
Start: 2022-08-19 | End: 2022-08-19

## 2022-08-19 RX ADMIN — SIMETHICONE 80 MILLIGRAM(S): 80 TABLET, CHEWABLE ORAL at 06:46

## 2022-08-19 RX ADMIN — Medication 975 MILLIGRAM(S): at 16:00

## 2022-08-19 RX ADMIN — Medication 30 MILLIGRAM(S): at 06:46

## 2022-08-19 RX ADMIN — SODIUM CHLORIDE 1000 MILLILITER(S): 9 INJECTION, SOLUTION INTRAVENOUS at 06:00

## 2022-08-19 RX ADMIN — Medication 30 MILLIGRAM(S): at 13:30

## 2022-08-19 RX ADMIN — Medication 30 MILLIGRAM(S): at 07:30

## 2022-08-19 RX ADMIN — IRON SUCROSE 110 MILLIGRAM(S): 20 INJECTION, SOLUTION INTRAVENOUS at 15:17

## 2022-08-19 RX ADMIN — Medication 1 TABLET(S): at 12:41

## 2022-08-19 RX ADMIN — Medication 30 MILLIGRAM(S): at 12:41

## 2022-08-19 RX ADMIN — Medication 975 MILLIGRAM(S): at 15:17

## 2022-08-19 RX ADMIN — Medication 325 MILLIGRAM(S): at 00:33

## 2022-08-19 RX ADMIN — Medication 975 MILLIGRAM(S): at 09:27

## 2022-08-19 RX ADMIN — Medication 30 MILLIGRAM(S): at 00:01

## 2022-08-19 RX ADMIN — Medication 975 MILLIGRAM(S): at 03:18

## 2022-08-19 RX ADMIN — Medication 600 MILLIGRAM(S): at 18:45

## 2022-08-19 RX ADMIN — Medication 975 MILLIGRAM(S): at 10:00

## 2022-08-19 RX ADMIN — Medication 975 MILLIGRAM(S): at 21:52

## 2022-08-19 NOTE — PROGRESS NOTE ADULT - SUBJECTIVE AND OBJECTIVE BOX
PGY2 note  Chief Complaint: Post  section    Patient seen and examined. Pain well controlled at this time. No complaints at this time. Denies fever, chills, nausea, vomiting, chest pain, shortness of breath, severe abdominal pain, heavy vaginal bleeding. Patient is tolerating PO, passing flatus, lipscomb in place, not yet OOB to chair.     Physical Exam  Vital Signs Last 24 Hrs  T(F): 98.4 (19 Aug 2022 03:05), Max: 98.4 (19 Aug 2022 03:05)  HR: 100 (19 Aug 2022 03:05) (66 - 113)  BP: 141/71 (19 Aug 2022 03:05) (112/70 - 163/87)  RR: 18 (19 Aug 2022 03:05) (14 - 18)    Physical exam:  General - AAOx3, NAD  Heart - S1S2, RRR  Lungs - CTA BL  Abdomen:  - Soft, appropriately tender, mildly distended, BS+. Clean, dry, intact dermabond in place over pfannenstiel skin incision.  - Fundus firm, appropriately tender, below the umbilicus  - No rebound or guarding  Pelvis/Vagina - Normal Lochia  Extremities - No calf tenderness, no swelling    UO (min 41cc/hr): (5398-1662) 100, (3086-2890) 200, (5736-2482) 125-->1L bolus (7039-3435) 100  Labs:                        7.8    15.62 )-----------( 239      ( 19 Aug 2022 05:18 )             24.2                         11.5   11.69 )-----------( 238      ( 18 Aug 2022 09:09 )             34.2     Antibody Screen: NEG (22 @ 09:07)    Prenatal Syphilis Test: Nonreact (22 @ 09:09)    Antibody Screen: NEG (22 @ 09:07)    MEDICATIONS  (STANDING):  acetaminophen     Tablet .. 975 milliGRAM(s) Oral <User Schedule>  diphtheria/tetanus/pertussis (acellular) Vaccine (ADAcel) 0.5 milliLiter(s) IntraMuscular once  ferrous    sulfate 325 milliGRAM(s) Oral daily  ibuprofen  Tablet. 600 milliGRAM(s) Oral every 6 hours  ketorolac   Injectable 30 milliGRAM(s) IV Push every 6 hours  lactated ringers. 1000 milliLiter(s) (125 mL/Hr) IV Continuous <Continuous>  oxytocin Infusion 333.333 milliUNIT(s)/Min (1000 mL/Hr) IV Continuous <Continuous>  prenatal multivitamin 1 Tablet(s) Oral daily    MEDICATIONS  (PRN):  diphenhydrAMINE 25 milliGRAM(s) Oral every 6 hours PRN Pruritus  lanolin Ointment 1 Application(s) Topical every 6 hours PRN Sore Nipples  magnesium hydroxide Suspension 30 milliLiter(s) Oral two times a day PRN Constipation  oxyCODONE    IR 5 milliGRAM(s) Oral every 3 hours PRN Moderate to Severe Pain (4-10)  oxyCODONE    IR 5 milliGRAM(s) Oral once PRN Moderate to Severe Pain (4-10)  simethicone 80 milliGRAM(s) Chew every 4 hours PRN Gas

## 2022-08-19 NOTE — PROGRESS NOTE ADULT - ASSESSMENT
A/P: 46y now P2, s/p repeat c/s#2 with tubal ligation, GDMA1, preeclampsia without severe features, pod#1, recovering well.     - monitor blood pressures   - pain management with PO pain meds   - monitor vitals/bleeding   - encourage incentive spirometry   - ambulation/PO hydration  - advance diet as tolerated   - simethicone  - SCDs for DVT prophylaxis   - f/u 1100 labs   - routine postop care     Dr. Antony and Dr. Rajan to be made aware.

## 2022-08-20 ENCOUNTER — TRANSCRIPTION ENCOUNTER (OUTPATIENT)
Age: 47
End: 2022-08-20

## 2022-08-20 VITALS
SYSTOLIC BLOOD PRESSURE: 124 MMHG | TEMPERATURE: 96 F | HEART RATE: 113 BPM | RESPIRATION RATE: 18 BRPM | DIASTOLIC BLOOD PRESSURE: 71 MMHG

## 2022-08-20 RX ORDER — SIMETHICONE 80 MG/1
1 TABLET, CHEWABLE ORAL
Qty: 28 | Refills: 0
Start: 2022-08-20 | End: 2022-08-26

## 2022-08-20 RX ORDER — OXYCODONE HYDROCHLORIDE 5 MG/1
1 TABLET ORAL
Qty: 12 | Refills: 0
Start: 2022-08-20 | End: 2022-08-22

## 2022-08-20 RX ORDER — ACETAMINOPHEN 500 MG
3 TABLET ORAL
Qty: 60 | Refills: 0
Start: 2022-08-20 | End: 2022-08-24

## 2022-08-20 RX ORDER — DOCUSATE SODIUM 100 MG
1 CAPSULE ORAL
Qty: 28 | Refills: 0
Start: 2022-08-20 | End: 2022-09-02

## 2022-08-20 RX ORDER — IBUPROFEN 200 MG
1 TABLET ORAL
Qty: 28 | Refills: 0
Start: 2022-08-20 | End: 2022-08-26

## 2022-08-20 RX ADMIN — Medication 1 TABLET(S): at 11:42

## 2022-08-20 RX ADMIN — Medication 975 MILLIGRAM(S): at 03:49

## 2022-08-20 RX ADMIN — Medication 325 MILLIGRAM(S): at 11:42

## 2022-08-20 RX ADMIN — Medication 975 MILLIGRAM(S): at 08:58

## 2022-08-20 RX ADMIN — Medication 600 MILLIGRAM(S): at 11:48

## 2022-08-20 RX ADMIN — Medication 600 MILLIGRAM(S): at 12:31

## 2022-08-20 RX ADMIN — Medication 600 MILLIGRAM(S): at 07:02

## 2022-08-20 NOTE — PROGRESS NOTE ADULT - ASSESSMENT
A/P: 46y now P2, s/p repeat c/s#2 with tubal ligation, GDMA1, preeclampsia without severe features, pod2, recovering well.   - monitor blood pressures   - pain management with PO pain meds   - monitor vitals/bleeding   - encourage incentive spirometry   - ambulation/PO hydration  - advance diet as tolerated   - simethicone  - SCDs for DVT prophylaxis   - routine postop care   - anticipate dc home today    Dr. Vance and Dr. Rajan aware.  A/P: 46y now P2, POD#2 s/p repeat c/s#2 with tubal ligation, GDMA1, preeclampsia without severe features, recovering well.   - monitor vitals q4 hours  - pain management prn  - monitor bleeding   - encourage incentive spirometry   - encourage ambulation  - encouarge PO hydration  - advance diet as tolerated   - simethicone  - SCDs for DVT prophylaxis   - routine postop care   - anticipate dc home today with follow-up with PMD in one week for an incision check     Dr. Vance and Dr. Rajan aware.

## 2022-08-20 NOTE — DISCHARGE NOTE OB - MEDICATION SUMMARY - MEDICATIONS TO TAKE
I will START or STAY ON the medications listed below when I get home from the hospital:    acetaminophen 325 mg oral tablet  -- 3 tab(s) by mouth every 6 hours   -- Indication: For pain    ibuprofen 600 mg oral tablet  -- 1 tab(s) by mouth every 6 hours  -- Indication: For pain    oxyCODONE 5 mg oral tablet  -- 1 tab(s) by mouth every 6 hours, As Needed -Moderate to Severe Pain (4-10) MDD:MDD: 4  -- Indication: For pain    Prenatal Multivitamins with Folic Acid 1 mg oral tablet  -- 1 tab(s) by mouth once a day  -- Indication: For postpartum    Colace 100 mg oral capsule  -- 1 cap(s) by mouth 2 times a day   -- Medication should be taken with plenty of water.    -- Indication: For Constipation    simethicone 80 mg oral tablet, chewable  -- 1 tab(s) by mouth every 6 hours, As Needed -Gas   -- Indication: For Gas

## 2022-08-20 NOTE — DISCHARGE NOTE OB - PATIENT PORTAL LINK FT
You can access the FollowMyHealth Patient Portal offered by [facility] by registering at the following website: [email or web address]. By joining [facility]’s FollowMyHealth portal, you will also be able to view your health information using other applications (apps) compatible with our system.

## 2022-08-20 NOTE — PROGRESS NOTE ADULT - SUBJECTIVE AND OBJECTIVE BOX
PGY1 note  Chief Complaint: Post  section    Patient seen and examined. Pain well controlled at this time. No complaints at this time. Denies fever, chills, nausea, vomiting, chest pain, shortness of breath, severe abdominal pain, heavy vaginal bleeding. Patient is tolerating PO, passing flatus, and ambulating.     Physical Exam  Vital Signs Last 24 Hrs  T(C): 36.1 (20 Aug 2022 08:54), Max: 36.3 (19 Aug 2022 15:26)  T(F): 96.9 (20 Aug 2022 08:54), Max: 97.4 (19 Aug 2022 15:26)  HR: 100 (20 Aug 2022 08:54) (100 - 112)  BP: 119/75 (20 Aug 2022 08:54) (106/63 - 119/75)  RR: 18 (20 Aug 2022 08:54) (18 - 18)    Physical exam:  General - AAOx3, NAD  Heart - S1S2, RRR  Lungs - CTA BL  Abdomen:  - Soft, appropriately tender, mildly distended, BS+. Clean, dry, intact dermabond in place over pfannenstiel skin incision.  - Fundus firm, appropriately tender, below the umbilicus  - No rebound or guarding  Pelvis/Vagina - Normal Lochia  Extremities - No calf tenderness, no swelling    Labs:                        7.3    13.74 )-----------( 249      ( 19 Aug 2022 12:20 )             22.2                         7.8    15.62 )-----------( 239      ( 19 Aug 2022 05:18 )             24.2                         11.5   11.69 )-----------( 238      ( 18 Aug 2022 09:09 )             34.2     Antibody Screen: NEG (22 @ 09:07)    Prenatal Syphilis Test: Nonreact (22 @ 09:09)    Antibody Screen: NEG (22 @ 09:07)    MEDICATIONS  (STANDING):  acetaminophen     Tablet .. 975 milliGRAM(s) Oral <User Schedule>  ferrous    sulfate 325 milliGRAM(s) Oral daily  ibuprofen  Tablet. 600 milliGRAM(s) Oral every 6 hours  lactated ringers. 1000 milliLiter(s) (125 mL/Hr) IV Continuous <Continuous>  prenatal multivitamin 1 Tablet(s) Oral daily    MEDICATIONS  (PRN):  diphenhydrAMINE 25 milliGRAM(s) Oral every 6 hours PRN Pruritus  lanolin Ointment 1 Application(s) Topical every 6 hours PRN Sore Nipples  magnesium hydroxide Suspension 30 milliLiter(s) Oral two times a day PRN Constipation  oxyCODONE    IR 5 milliGRAM(s) Oral once PRN Moderate to Severe Pain (4-10)  oxyCODONE    IR 5 milliGRAM(s) Oral every 3 hours PRN Moderate to Severe Pain (4-10)  simethicone 80 milliGRAM(s) Chew every 4 hours PRN Gas

## 2022-08-20 NOTE — DISCHARGE NOTE OB - NS MD DC FALL RISK RISK
For information on Fall & Injury Prevention, visit: [email or web address] OR  [email or web address] OR  https://www.cdc.gov/steadi/patient.html

## 2022-08-20 NOTE — DISCHARGE NOTE OB - CARE PROVIDER_API CALL
Nano Rajan)  Obstetrics and Gynecology  [address] [place] [id number]  Phone: [phone]  Fax: [phone]  Established Patient  Follow Up Time: 1 week

## 2022-08-22 LAB — SURGICAL PATHOLOGY STUDY: SIGNIFICANT CHANGE UP

## 2022-08-24 LAB
BILIRUB UR QL STRIP: NORMAL
BP DIAS: 81 MM HG
BP SYS: 123 MM HG
CLARITY UR: CLEAR
COLLECTION METHOD: NORMAL
FETAL HEART RATE (BPM): 153
FETAL MOVEMENT: PRESENT
GLUCOSE BLDC GLUCOMTR-MCNC: 110
GLUCOSE UR-MCNC: NORMAL
HCG UR QL: 0.2 EU/DL
HGB UR QL STRIP.AUTO: NORMAL
KETONES UR-MCNC: NORMAL
LEUKOCYTE ESTERASE UR QL STRIP: NORMAL
NITRITE UR QL STRIP: NORMAL
OB COMMENTS: NORMAL
PH UR STRIP: 6.5
PROT UR STRIP-MCNC: NORMAL
SCHEDULED VISIT: YES
SP GR UR STRIP: 1.01
URINE ALBUMIN/PROTEIN: NORMAL
URINE GLUCOSE: NORMAL
URINE KETONES: NORMAL
WEEKS GESTATION: 37.4

## 2022-08-31 ENCOUNTER — EMERGENCY (EMERGENCY)
Facility: HOSPITAL | Age: 47
LOS: 0 days | Discharge: HOME | End: 2022-08-31
Attending: EMERGENCY MEDICINE | Admitting: EMERGENCY MEDICINE

## 2022-08-31 VITALS
HEART RATE: 103 BPM | OXYGEN SATURATION: 100 % | TEMPERATURE: 98 F | DIASTOLIC BLOOD PRESSURE: 86 MMHG | RESPIRATION RATE: 19 BRPM | HEIGHT: 65 IN | WEIGHT: 169.98 LBS | SYSTOLIC BLOOD PRESSURE: 147 MMHG

## 2022-08-31 DIAGNOSIS — B00.9 HERPESVIRAL INFECTION, UNSPECIFIED: ICD-10-CM

## 2022-08-31 DIAGNOSIS — O34.211 MATERNAL CARE FOR LOW TRANSVERSE SCAR FROM PREVIOUS CESAREAN DELIVERY: ICD-10-CM

## 2022-08-31 DIAGNOSIS — Z3A.38 38 WEEKS GESTATION OF PREGNANCY: ICD-10-CM

## 2022-08-31 DIAGNOSIS — Z98.891 HISTORY OF UTERINE SCAR FROM PREVIOUS SURGERY: Chronic | ICD-10-CM

## 2022-08-31 DIAGNOSIS — Z28.09 IMMUNIZATION NOT CARRIED OUT BECAUSE OF OTHER CONTRAINDICATION: ICD-10-CM

## 2022-08-31 DIAGNOSIS — Z98.51 TUBAL LIGATION STATUS: ICD-10-CM

## 2022-08-31 LAB
ALBUMIN SERPL ELPH-MCNC: 3.4 G/DL — LOW (ref 3.5–5.2)
ALP SERPL-CCNC: 99 U/L — SIGNIFICANT CHANGE UP (ref 30–115)
ALT FLD-CCNC: 23 U/L — SIGNIFICANT CHANGE UP (ref 0–41)
ANION GAP SERPL CALC-SCNC: 12 MMOL/L — SIGNIFICANT CHANGE UP (ref 7–14)
APTT BLD: 30 SEC — SIGNIFICANT CHANGE UP (ref 27–39.2)
AST SERPL-CCNC: 27 U/L — SIGNIFICANT CHANGE UP (ref 0–41)
BASOPHILS # BLD AUTO: 0.04 K/UL — SIGNIFICANT CHANGE UP (ref 0–0.2)
BASOPHILS NFR BLD AUTO: 0.4 % — SIGNIFICANT CHANGE UP (ref 0–1)
BILIRUB SERPL-MCNC: 1 MG/DL — SIGNIFICANT CHANGE UP (ref 0.2–1.2)
BLD GP AB SCN SERPL QL: SIGNIFICANT CHANGE UP
BUN SERPL-MCNC: 17 MG/DL — SIGNIFICANT CHANGE UP (ref 10–20)
CALCIUM SERPL-MCNC: 8.6 MG/DL — SIGNIFICANT CHANGE UP (ref 8.5–10.1)
CHLORIDE SERPL-SCNC: 107 MMOL/L — SIGNIFICANT CHANGE UP (ref 98–110)
CO2 SERPL-SCNC: 22 MMOL/L — SIGNIFICANT CHANGE UP (ref 17–32)
CREAT SERPL-MCNC: 0.7 MG/DL — SIGNIFICANT CHANGE UP (ref 0.7–1.5)
EGFR: 109 ML/MIN/1.73M2 — SIGNIFICANT CHANGE UP
EOSINOPHIL # BLD AUTO: 0.07 K/UL — SIGNIFICANT CHANGE UP (ref 0–0.7)
EOSINOPHIL NFR BLD AUTO: 0.7 % — SIGNIFICANT CHANGE UP (ref 0–8)
GLUCOSE SERPL-MCNC: 82 MG/DL — SIGNIFICANT CHANGE UP (ref 70–99)
HCT VFR BLD CALC: 29.7 % — LOW (ref 37–47)
HGB BLD-MCNC: 9.4 G/DL — LOW (ref 12–16)
IMM GRANULOCYTES NFR BLD AUTO: 0.7 % — HIGH (ref 0.1–0.3)
INR BLD: 0.99 RATIO — SIGNIFICANT CHANGE UP (ref 0.65–1.3)
LACTATE SERPL-SCNC: 0.5 MMOL/L — LOW (ref 0.7–2)
LYMPHOCYTES # BLD AUTO: 1.63 K/UL — SIGNIFICANT CHANGE UP (ref 1.2–3.4)
LYMPHOCYTES # BLD AUTO: 16.7 % — LOW (ref 20.5–51.1)
MCHC RBC-ENTMCNC: 28.6 PG — SIGNIFICANT CHANGE UP (ref 27–31)
MCHC RBC-ENTMCNC: 31.6 G/DL — LOW (ref 32–37)
MCV RBC AUTO: 90.3 FL — SIGNIFICANT CHANGE UP (ref 81–99)
MONOCYTES # BLD AUTO: 0.55 K/UL — SIGNIFICANT CHANGE UP (ref 0.1–0.6)
MONOCYTES NFR BLD AUTO: 5.6 % — SIGNIFICANT CHANGE UP (ref 1.7–9.3)
NEUTROPHILS # BLD AUTO: 7.4 K/UL — HIGH (ref 1.4–6.5)
NEUTROPHILS NFR BLD AUTO: 75.9 % — HIGH (ref 42.2–75.2)
NRBC # BLD: 0 /100 WBCS — SIGNIFICANT CHANGE UP (ref 0–0)
PLATELET # BLD AUTO: 313 K/UL — SIGNIFICANT CHANGE UP (ref 130–400)
POTASSIUM SERPL-MCNC: 4.1 MMOL/L — SIGNIFICANT CHANGE UP (ref 3.5–5)
POTASSIUM SERPL-SCNC: 4.1 MMOL/L — SIGNIFICANT CHANGE UP (ref 3.5–5)
PROT SERPL-MCNC: 6 G/DL — SIGNIFICANT CHANGE UP (ref 6–8)
PROTHROM AB SERPL-ACNC: 11.4 SEC — SIGNIFICANT CHANGE UP (ref 9.95–12.87)
RBC # BLD: 3.29 M/UL — LOW (ref 4.2–5.4)
RBC # FLD: 18.6 % — HIGH (ref 11.5–14.5)
SODIUM SERPL-SCNC: 141 MMOL/L — SIGNIFICANT CHANGE UP (ref 135–146)
WBC # BLD: 9.76 K/UL — SIGNIFICANT CHANGE UP (ref 4.8–10.8)
WBC # FLD AUTO: 9.76 K/UL — SIGNIFICANT CHANGE UP (ref 4.8–10.8)

## 2022-08-31 PROCEDURE — 99285 EMERGENCY DEPT VISIT HI MDM: CPT

## 2022-08-31 PROCEDURE — 74177 CT ABD & PELVIS W/CONTRAST: CPT | Mod: 26,MA

## 2022-08-31 RX ORDER — SODIUM CHLORIDE 9 MG/ML
1000 INJECTION INTRAMUSCULAR; INTRAVENOUS; SUBCUTANEOUS ONCE
Refills: 0 | Status: COMPLETED | OUTPATIENT
Start: 2022-08-31 | End: 2022-08-31

## 2022-08-31 RX ORDER — PIPERACILLIN AND TAZOBACTAM 4; .5 G/20ML; G/20ML
3.38 INJECTION, POWDER, LYOPHILIZED, FOR SOLUTION INTRAVENOUS ONCE
Refills: 0 | Status: DISCONTINUED | OUTPATIENT
Start: 2022-08-31 | End: 2022-08-31

## 2022-08-31 NOTE — ED PROVIDER NOTE - CLINICAL SUMMARY MEDICAL DECISION MAKING FREE TEXT BOX
Patient evaluated for postsurgical bleeding.  CT scan showed small hematoma.  Patient evaluated by OB/GYN and cleared for discharge lab work is stable no active bleeding at this time

## 2022-08-31 NOTE — CONSULT NOTE ADULT - ASSESSMENT
46y  s/p repeat LTCS #2 with tubal ligation POD#13 with surgical site bleeding, r/o dehiscence,  -recommend CT and CBC    Dr. Rjaan and Dr. Terrell aware      ***FINAL ASSESSMENT PENDING RESULTS OF WORK UP*** 46y  s/p repeat LTCS #2 with tubal ligation POD#13 with surgical site bleeding, superficial hematoma vs fascial dehiscence  -recommend CT and CBC    Dr. Rajan and Dr. Terrell aware      ***FINAL ASSESSMENT PENDING RESULTS OF WORK UP*** 46y  s/p repeat LTCS #2 with tubal ligation POD#13 with surgical site bleeding 2/2 rectus muscle hematoma  -bleeding precautions discussed  -patient to follow up with Dr. Rajan this   -dispo per ED    Dr. Rajan and Dr. Terrell aware

## 2022-08-31 NOTE — ED PROVIDER NOTE - COVID-19 ORDERING FACILITY
[facility]  - [address] [facility]

## 2022-08-31 NOTE — ED ADULT NURSE NOTE - COVID-19 ORDERING FACILITY
[facility]  - [address] [facility]

## 2022-08-31 NOTE — ED PROVIDER NOTE - PHYSICAL EXAMINATION
CONST: Well appearing in NAD  EYES: Sclera and conjunctiva clear.   ENT: No nasal discharge. Oropharynx normal appearing, no erythema or exudates. No abscess or swelling. Uvula midline.   NECK: Non-tender, no meningeal signs. normal ROM. supple   CARD: S1 S2; No jvd  RESP: Equal BS B/L, No wheezes, rhonchi or rales. No distress  GI: Soft, non-tender, non-distended. no cva tenderness. normal BS  MS: Normal ROM in all extremities. pulses 2 +. no calf tenderness or swelling  SKIN: Small amount of bleeding from L  surgical site  NEURO: A&Ox3, No focal deficits. Strength 5/5 with no sensory deficits. Steady gait.

## 2022-08-31 NOTE — ED PROVIDER NOTE - NS ED ATTENDING STATEMENT MOD
This was a shared visit with the QUETA. I reviewed and verified the documentation and independently performed the documented:

## 2022-08-31 NOTE — ED PROVIDER NOTE - OBJECTIVE STATEMENT
46y F  presents for eval of surgical wound. Pt had  by Dr. Rajan , yesterday developed painless bleeding from L lower abdomen surgical site, no inciting or relieving factors. Denies fever, swelling, dc, dysuria, hematuria, n/v/d/c

## 2022-08-31 NOTE — ED PROVIDER NOTE - PATIENT PORTAL LINK FT
You can access the FollowMyHealth Patient Portal offered by [facility] by registering at the following website: [email or web address]. By joining [facility]’s FollowMyHealth portal, you will also be able to view your health information using other applications (apps) compatible with our system.

## 2022-08-31 NOTE — ED ADULT NURSE NOTE - OBJECTIVE STATEMENT
Pt presented to ED c/o med eval. Pt s/p  procedure x2 weeks ago, states incision bloody. Denies trauma/AC use. Pt denies n/v/d/fevers/chills. Pt A&Ox4, ambulatory.

## 2022-08-31 NOTE — CONSULT NOTE ADULT - SUBJECTIVE AND OBJECTIVE BOX
Chief Complaint: operative site bleeding    HPI: 45yo  s/p repeat LTCS #2 with tubal ligation POD#13 presenting due to bleeding from left border of pfannenstiel incision for the past day. Reports she noted a large bruise on her left abdomen on POD#3 that was nonpainful, and was otherwise healing well. Today she began noticing steady trickle of blood from her surgical incision and presented to the ED for further evaluation. Denies HA, dizziness, fevers or chills, No cough, wheezing, No shortness of breath, No chest pain or palpitations, No nausea, vomiting, No diarrhea or constipation, No dysuria, frequency or hematuria, no abnormal vaginal discharge.   Patient's pregnancy was complicated by GDMA1, preeclampsia without severe features, and postoperative acute blood loss anemia for which she received IV venofer postpartum. Blood pressures postpartum have been well controlled, 130s-140s/80s-90s.    Ob/Gyn History:      2004 FT C/S in [facility], secondary to cord prolapse, 3100g  2022 FT CS, 2770gm  Etop x1 (pt did not endorse, was noted in chart)  Ectopic x4, s/p MTX x4  Denies history of ovarian cysts, uterine fibroids, abnormal paps, or STIs  -HSV-1 serology positive on prenatal testing, never had an outbreak    Home Medications:  Prenatal Multivitamins with Folic Acid 1 mg oral tablet: 1 tab(s) orally once a day (20 Aug 2022 09:09)      Allergies  No Known Allergies    PAST MEDICAL & SURGICAL HISTORY:  No pertinent past medical history  H/O  section          FAMILY HISTORY:  No pertinent family history in first degree relatives        SOCIAL HISTORY: Denies cigarette use, alcohol use, or illicit drug use    Vital Signs Last 24 Hrs  T(F): 97.9 (31 Aug 2022 14:57), Max: 97.9 (31 Aug 2022 14:57)  HR: 103 (31 Aug 2022 14:57) (103 - 103)  BP: 147/86 (31 Aug 2022 14:57) (147/86 - 147/86)  RR: 19 (31 Aug 2022 14:57) (19 - 19)  Height (cm): 165.1 (22 @ 14:57)  Weight (kg): 77.1 (22 @ 14:57)  BMI (kg/m2): 28.3 (22 @ 14:57)  BSA (m2): 1.85 (22 @ 14:57)    General Appearance - AAOx3, NAD  Heart - S1S2 regular rate and rhythm  Lung - CTA Bilaterally  Abdomen - Soft, obese, nontender, nondistended, no rebound, no rigidity, no guarding, bowel sounds present  -left sided 5x5cm bruise noted on pannus, nontender, no erythema  -well healing Pfannenstiel incision with no tenderness, erythema, or purulent discharge  -0.5cm opening on left lateral edge with dark red blood expressed, tracking 0.5cm laterally and 5-6cm deep.     GYN/Pelvis: deferred    Meds:     Height (cm): 165.1 (22 @ 14:57)  Weight (kg): 77.1 (22 @ 14:57)  BMI (kg/m2): 28.3 (22 @ 14:57)  BSA (m2): 1.85 (22 @ 14:57)    LABS:                      RADIOLOGY & ADDITIONAL STUDIES:   Chief Complaint: operative site bleeding    HPI: 45yo  s/p repeat LTCS #2 with tubal ligation POD#13 presenting due to bleeding from left border of pfannenstiel incision for the past day. Reports she noted a large bruise on her left abdomen on POD#3 that was nonpainful, and was otherwise healing well. Today she began noticing steady trickle of blood from her surgical incision and presented to the ED for further evaluation. Denies HA, dizziness, fevers or chills, No cough, wheezing, No shortness of breath, No chest pain or palpitations, No nausea, vomiting, No diarrhea or constipation, No dysuria, frequency or hematuria, no abnormal vaginal discharge.   Patient's pregnancy was complicated by GDMA1, preeclampsia without severe features, and postoperative acute blood loss anemia for which she received IV venofer postpartum. Blood pressures postpartum have been well controlled, 130s-140s/80s-90s.    Ob/Gyn History:      2004 FT C/S in [facility], secondary to cord prolapse, 3100g  2022 FT CS, 2770gm  Etop x1 (pt did not endorse, was noted in chart)  Ectopic x4, s/p MTX x4  Denies history of ovarian cysts, uterine fibroids, abnormal paps, or STIs  -HSV-1 serology positive on prenatal testing, never had an outbreak    Home Medications:  Prenatal Multivitamins with Folic Acid 1 mg oral tablet: 1 tab(s) orally once a day (20 Aug 2022 09:09)      Allergies  No Known Allergies    PAST MEDICAL & SURGICAL HISTORY:  No pertinent past medical history  H/O  section          FAMILY HISTORY:  No pertinent family history in first degree relatives        SOCIAL HISTORY: Denies cigarette use, alcohol use, or illicit drug use    Vital Signs Last 24 Hrs  T(F): 97.9 (31 Aug 2022 14:57), Max: 97.9 (31 Aug 2022 14:57)  HR: 103 (31 Aug 2022 14:57) (103 - 103)  BP: 147/86 (31 Aug 2022 14:57) (147/86 - 147/86)  RR: 19 (31 Aug 2022 14:57) (19 - 19)  Height (cm): 165.1 (22 @ 14:57)  Weight (kg): 77.1 (22 @ 14:57)  BMI (kg/m2): 28.3 (22 @ 14:57)  BSA (m2): 1.85 (22 @ 14:57)    General Appearance - AAOx3, NAD  Heart - S1S2 regular rate and rhythm  Lung - CTA Bilaterally  Abdomen - Soft, obese, nontender, nondistended, no rebound, no rigidity, no guarding, bowel sounds present  -left sided 5x5cm bruise noted on pannus, nontender, no erythema  -well healing Pfannenstiel incision with no tenderness, erythema, or purulent discharge  -0.5cm opening on left lateral edge with dark red blood expressed, tracking 0.5cm laterally and 5-6cm deep.     GYN/Pelvis: deferred    Meds:     Height (cm): 165.1 (22 @ 14:57)  Weight (kg): 77.1 (22 @ 14:57)  BMI (kg/m2): 28.3 (22 @ 14:57)  BSA (m2): 1.85 (22 @ 14:57)    LABS:                          9.4    9.76  )-----------( 313      ( 31 Aug 2022 17:06 )             29.7       ABO RH Interpretation: O POS (22 @ 17:06)  Antibody Screen: NEG (22 @ 17:06)        141  |  107  |  17  ----------------------------<  82  4.1   |  22  |  0.7    Ca    8.6      31 Aug 2022 17:06    TPro  6.0  /  Alb  3.4<L>  /  TBili  1.0  /  DBili  x   /  AST  27  /  ALT  23  /  AlkPhos  99      PT/INR - ( 31 Aug 2022 17:06 )   PT: 11.40 sec;   INR: 0.99 ratio         PTT - ( 31 Aug 2022 17:06 )  PTT:30.0 sec        RADIOLOGY & ADDITIONAL STUDIES:    < from: CT Abdomen and Pelvis w/ IV Cont (22 @ 17:41) >  FINDINGS:    LOWER CHEST: Unremarkable.    HEPATOBILIARY: Unremarkable.    SPLEEN: Unremarkable.    PANCREAS: Unremarkable.    ADRENAL GLANDS: Unremarkable.    KIDNEYS: Unremarkable.    ABDOMINOPELVIC NODES: Unremarkable.    PELVIC ORGANS: Post recent , with postsurgical changes in the   anterior abdominal wall and the anterior fundal region of the uterus.   There is enlargement of the left rectus abdominis muscle including mixed   density collection measuring 7 x 2.8 x 7 cm (transverse by AP by cc),   consistent with hematoma.    PERITONEUM/MESENTERY/BOWEL: No bowel obstruction, pneumoperitoneum or   ascites.    BONES/SOFT TISSUES: Degenerative changes of the spine, bilateral   sacroiliac joints and hips.    IMPRESSION: Post  with postsurgical changes in the lower   anterior abdominal wall and anterior portion of the uterus.    7 cm left rectus abdominis muscular hematoma.    < end of copied text >

## 2022-08-31 NOTE — ED PROVIDER NOTE - NS ED ROS FT
Constitutional: (-) fever  Eyes/ENT: (-) blurry vision, (-) epistaxis  Cardiovascular: (-) chest pain, (-) syncope  Respiratory: (-) cough, (-) shortness of breath  Gastrointestinal: (-) vomiting, (-) diarrhea  : (-) dysuria, (-) hematuria  Musculoskeletal: (-) neck pain, (-) back pain, (-) joint pain  Integumentary: (+) bleeding surgical site, (-) rash, (-) edema  Neurological: (-) headache, (-) altered mental status  Allergic/Immunologic: (-) pruritus

## 2022-08-31 NOTE — ED PROVIDER NOTE - ATTENDING APP SHARED VISIT CONTRIBUTION OF CARE
Patient presenting with bleeding from .   occurred .  Today she was in the grocery store noticed some leakage and realized that it was bleeding.  There is no pain.  There is no pus.  Denies any fevers.  The exam shows that at the lateral aspect of the  there is a small opening with likely wound dehiscence with mild oozing bleed.  Plan is to consult OB

## 2022-08-31 NOTE — ED PROVIDER NOTE - NSFOLLOWUPINSTRUCTIONS_ED_ALL_ED_FT
Follow up with PMD and OBGYN in 1-2 days.    Wound Care    Taking care of your wound properly can help to prevent pain and infection. It can also help your wound to heal more quickly.     HOW TO CARE FOR YOUR WOUND   Take or apply over-the-counter and prescription medicines only as told by your health care provider.  If you were prescribed antibiotic medicine, take or apply it as told by your health care provider. Do not stop using the antibiotic even if your condition improves.  Clean the wound each day or as told by your health care provider.  Wash the wound with mild soap and water.  Rinse the wound with water to remove all soap.  Pat the wound dry with a clean towel. Do not rub it.  There are many different ways to close and cover a wound. For example, a wound can be covered with stitches (sutures), skin glue, or adhesive strips. Follow instructions from your health care provider about:  How to take care of your wound.  When and how you should change your bandage (dressing).  When you should remove your dressing.  Removing whatever was used to close your wound.  Check your wound every day for signs of infection. Watch for:  Redness, swelling, or pain.  Fluid, blood, or pus.  Keep the dressing dry until your health care provider says it can be removed. Do not take baths, swim, use a hot tub, or do anything that would put your wound underwater until your health care provider approves.  Raise (elevate) the injured area above the level of your heart while you are sitting or lying down.  Do not scratch or pick at the wound.  Keep all follow-up visits as told by your health care provider. This is important.    SEEK MEDICAL CARE IF:  You received a tetanus shot and you have swelling, severe pain, redness, or bleeding at the injection site.  You have a fever.  Your pain is not controlled with medicine.  You have increased redness, swelling, or pain at the site of your wound.  You have fluid, blood, or pus coming from your wound.  You notice a bad smell coming from your wound or your dressing.    SEEK IMMEDIATE MEDICAL CARE IF:  You have a red streak going away from your wound.

## 2022-08-31 NOTE — ED ADULT NURSE NOTE - ALCOHOL PRE SCREEN (AUDIT - C)
Sugars going up.  In the diabetic range now.  Must follow a strict diet and recheck A1c in 3 months.  If still high will need meds Statement Selected

## 2023-03-08 NOTE — OB RN PATIENT PROFILE - CHOOSE INDICATION TO IMMUNIZE (AN ORDER WILL BE GENERATED WHEN THIS NOTE IS SAVED):
4 = No assist / stand by assistance Patient is pregnant regardless of trimester (administer thimerosal-free vaccine)

## 2023-12-27 ENCOUNTER — NON-APPOINTMENT (OUTPATIENT)
Age: 48
End: 2023-12-27

## 2024-04-05 NOTE — OB PROVIDER TRIAGE NOTE - NS_ABDFINDING_OBGYN_ALL_OB
Soft, nontender
abd pain w constipation x 1 month , put on abx for SIBO (small intestinal bacterial overgrowth), caused diarrhea x 1 week. came today for upper epigastric / chest discomfort with increased burping since today. EKG done. NAD @ rest. denies SOB, f/c, n/v.

## 2024-05-09 NOTE — OB PROVIDER DELIVERY SUMMARY - NSPPHRISKEVAL_OBGYN_ALL_OB
Applied fiberglass short arm cast to patients left arm per Dr. Blandon's written orders. Skin intact with no redness or bruising. Patient tolerated well. Instructed patient on casting care - do not get wet, do not stick/insert anything inside cast, elevate as needed, and call or seek ER attention for increase in pain and/or swelling. Provided patient/guardian a copy of cast care instructions. Patient/Guardian verbalized understanding.      
Prior , uterine surgery, or multiple laparotomies

## 2025-05-14 NOTE — OB RN DELIVERY SUMMARY - NS_ADMITLABOR_OBGYN_ALL_OB
No Patient with urinary retention and distended bladder on physical exam s/p intubation. Rothman placed in ED on 5/11.   -rothman removed 5/14, Trial of Void Patient with urinary retention and distended bladder on physical exam s/p intubation. Rothman placed in ED on 5/11.   -rothman removed 5/14, Trial of Void with high   Pt urinated 400cc and repeat  -> pt refusing SC at this time

## 2025-06-12 ENCOUNTER — OUTPATIENT (OUTPATIENT)
Dept: OUTPATIENT SERVICES | Facility: HOSPITAL | Age: 50
LOS: 1 days | End: 2025-06-12
Payer: COMMERCIAL

## 2025-06-12 DIAGNOSIS — Z98.891 HISTORY OF UTERINE SCAR FROM PREVIOUS SURGERY: Chronic | ICD-10-CM

## 2025-06-12 DIAGNOSIS — Z12.31 ENCOUNTER FOR SCREENING MAMMOGRAM FOR MALIGNANT NEOPLASM OF BREAST: ICD-10-CM

## 2025-06-12 DIAGNOSIS — Z00.8 ENCOUNTER FOR OTHER GENERAL EXAMINATION: ICD-10-CM

## 2025-06-12 PROCEDURE — 77063 BREAST TOMOSYNTHESIS BI: CPT | Mod: 26

## 2025-06-12 PROCEDURE — 77063 BREAST TOMOSYNTHESIS BI: CPT

## 2025-06-12 PROCEDURE — 77067 SCR MAMMO BI INCL CAD: CPT

## 2025-06-12 PROCEDURE — 77067 SCR MAMMO BI INCL CAD: CPT | Mod: 26

## 2025-06-13 DIAGNOSIS — Z12.31 ENCOUNTER FOR SCREENING MAMMOGRAM FOR MALIGNANT NEOPLASM OF BREAST: ICD-10-CM

## 2025-07-09 ENCOUNTER — APPOINTMENT (OUTPATIENT)
Dept: CARDIOLOGY | Facility: CLINIC | Age: 50
End: 2025-07-09